# Patient Record
Sex: MALE | Race: WHITE
[De-identification: names, ages, dates, MRNs, and addresses within clinical notes are randomized per-mention and may not be internally consistent; named-entity substitution may affect disease eponyms.]

---

## 2020-04-18 NOTE — PCM.HP.2
H&P History of Present Illness





- General


Date of Service: 04/18/20


Admit Problem/Dx: 


 Admission Diagnosis/Problem





Admission Diagnosis/Problem      Atrial fibrillation








Source of Information: Patient, Family, Provider, RN Notes Reviewed


History Limitations: Reports: No Limitations





- History of Present Illness


Initial Comments - Free Text/Narative: 





Mr. Fairbanks is a 79-year-old gentleman who is admitted to observation status 

through the emergency department for follow-up of facial weakness, dysarthria, 

weakness, and atrial fibrillation with rapid ventricular response, with 

underlying CHF exacerbation.  He denies any significant cardiac history other 

than previous episodes of atrial fibrillation in the distant past, 1 of which 

required cardioversion.  He does have a known history of COPD and continues to 

smoke 1 pack of cigarettes daily.  He has a cumulative 75-pack-year smoking 

history.  He denies recent symptoms of chest pain or pressure or significant 

increase in shortness of breath.  He does report generalized weakness over the 

past few days.  He was unaware of rapid heart rate, when he arrived in the 

emergency department was in atrial fibrillation with rapid ventricular 

response.  He denies being aware of any facial weakness or difficulty with his 

speech, this was noticed by family.  CT scan of the head shows evidence of 

atrophy and microvascular disease, no acute CVA.  Chest x-ray shows evidence of 

cardiac enlargement and pulmonary edema.  There is a questionable lesion noted 

on chest x-ray, with recommendation for follow-up chest x-ray or CT scan for 

further evaluation.





- Related Data


Allergies/Adverse Reactions: 


 Allergies











Allergy/AdvReac Type Severity Reaction Status Date / Time


 


diltiazem Allergy  Cannot Verified 04/18/20 13:20





   Remember  











Home Medications: 


 Home Meds





Cyanocobalamin (Vitamin B-12) [Vitamin B-12] 1 tab PO DAILY 04/18/20 [History]


Lisinopril/Hydrochlorothiazide [Lisinopril-Hctz 20-12.5 mg Tab] 2 tab PO DAILY 

04/18/20 [History]


Verapamil [Verapamil SR (24 Hr)] 180 mg PO DAILY 04/18/20 [History]











Past Medical History


HEENT History: Reports: Impaired Vision


Cardiovascular History: Reports: Hypertension


Neurological History: Reports: None





- Past Surgical History


Head Surgeries/Procedures: Reports: None


HEENT Surgical History: Reports: None


Cardiovascular Surgical History: Reports: None


Neurological Surgical History: Reports: Lumbar Spine





Social & Family History





- Tobacco Use


Smoking Status *Q: Current Every Day Smoker


Years of Tobacco use: 40


Packs/Tins Daily: 2


Used Tobacco, but Quit: No


Second Hand Smoke Exposure: No





- Caffeine Use


Caffeine Use: Reports: None





- Alcohol Use


Days Per Week of Alcohol Use: 7


Number of Drinks Per Day: 2


Total Drinks Per Week: 14





- Recreational Drug Use


Recreational Drug Use: No





H&P Review of Systems





- Review of Systems:


Review Of Systems: See Below


General: Reports: Malaise, Weakness, Decreased Appetite.  Denies: Fever, Chills


HEENT: Reports: No Symptoms


Pulmonary: Reports: Shortness of Breath, Wheezing.  Denies: Pleuritic Chest Pain

, Cough, Sputum, Hemoptysis


Cardiovascular: Reports: Dyspnea on Exertion, Edema.  Denies: Chest Pain, 

Palpitations, Orthopnea, PND, Lightheadedness


Gastrointestinal: Reports: No Symptoms


Genitourinary: Reports: No Symptoms


Musculoskeletal: Reports: No Symptoms


Skin: Reports: No Symptoms


Psychiatric: Reports: No Symptoms


Neurological: Reports: No Symptoms


Hematologic/Lymphatic: Reports: No Symptoms


Immunologic: Reports: No Symptoms





Exam





- Exam


Exam: See Below





- Vital Signs


Vital Signs: 


 Last Vital Signs











Temp  95.2 F L  04/18/20 13:36


 


Pulse  103 H  04/18/20 17:09


 


Resp  24 H  04/18/20 17:09


 


BP  134/90   04/18/20 17:09


 


Pulse Ox  91 L  04/18/20 16:06











Weight: 210 lb





- Exam


Quality Assessment: DVT Prophylaxis


General: Alert, Oriented, Cooperative, Mild Distress


HEENT: Conjunctiva Clear, Hearing Intact, Mucosa Moist & Pink, Normal Nasal 

Septum, Posterior Pharynx Clear, Pupils Equal


Neck: Supple, Trachea Midline, +2 Carotid Pulse wo Bruit


Lungs: Decreased Breath Sounds, Wheezing.  No: Crackles, Rales, Rhonchi, Rub


Cardiovascular: Normal S1, Normal S2, Irregular Rhythm, Tachycardia.  No: 

Systolic Murmur, Diastolic Murmur


GI/Abdominal Exam: Soft, Non-Tender, No Organomegaly, No Distention


Back Exam: Normal Inspection, Full Range of Motion


Extremities: Non-Tender, Pedal Edema


Skin: Warm, Dry, Intact


Neurological: Cranial Nerves Intact, Strength Equal Bilateral, Normal Speech, 

Normal Tone, Sensation Intact.  No: Focal Deficit


Neuro Extensive - Mental Status: Alert, Oriented x3, Normal Mood/Affect, Normal 

Cognition, Memory Intact





- Patient Data


Lab Results Last 24 hrs: 


 Laboratory Results - last 24 hr











  04/18/20 04/18/20 04/18/20 Range/Units





  13:50 13:52 13:52 


 


WBC  8.1    (4.5-11.0)  K/uL


 


RBC  4.54    (4.30-5.90)  M/uL


 


Hgb  15.3 H    (12.0-15.0)  g/dL


 


Hct  45.9    (40.0-54.0)  %


 


MCV  101 H    (80-98)  fL


 


MCH  34 H    (27-31)  pg


 


MCHC  33    (32-36)  %


 


Plt Count  141 L    (150-400)  K/uL


 


Neut % (Auto)  80 H    (36-66)  %


 


Lymph % (Auto)  7 L    (24-44)  %


 


Mono % (Auto)  11 H    (2-6)  %


 


Eos % (Auto)  1 L    (2-4)  %


 


Baso % (Auto)  1    (0-1)  %


 


Sodium   132 L   (140-148)  mmol/L


 


Potassium   4.9   (3.6-5.2)  mmol/L


 


Chloride   95 L   (100-108)  mmol/L


 


Carbon Dioxide   30   (21-32)  mmol/L


 


Anion Gap   11.9   (5.0-14.0)  mmol/L


 


BUN   10   (7-18)  mg/dL


 


Creatinine   0.8   (0.8-1.3)  mg/dL


 


Est Cr Clr Drug Dosing   73.63   mL/min


 


Estimated GFR (MDRD)   > 60   (>60)  


 


Glucose   159 H   ()  mg/dL


 


Calcium   8.4 L   (8.5-10.1)  mg/dL


 


Total Bilirubin   0.8   (0.2-1.0)  mg/dL


 


AST   28   (15-37)  U/L


 


ALT   6 L   (12-78)  U/L


 


Alkaline Phosphatase   102   ()  U/L


 


Troponin I   0.021   (0.000-0.056)  ng/mL


 


Total Protein   6.9   (6.4-8.2)  g/dL


 


Albumin   3.3 L   (3.4-5.0)  g/dL


 


Globulin   3.6 H   (2.3-3.5)  g/dL


 


Albumin/Globulin Ratio   0.9 L   (1.2-2.2)  


 


Ethyl Alcohol    < 3  mg/dL











Result Diagrams: 


 04/18/20 13:50





 04/18/20 13:52





Sepsis Event Note





- Evaluation


Sepsis Screening Result: No Definite Risk





- Focused Exam


Vital Signs: 


 Vital Signs











  Temp Pulse Resp BP Pulse Ox


 


 04/18/20 17:09   103 H  24 H  134/90 


 


 04/18/20 16:06   113 H  16  145/97 H  91 L


 


 04/18/20 15:43   114 H  19  138/90  90 L


 


 04/18/20 15:15   104 H  20  127/82  91 L


 


 04/18/20 14:59   106 H  27 H  135/96 H  90 L


 


 04/18/20 14:06   110 H  26 H  139/75  90 L


 


 04/18/20 14:01   121 H  27 H  134/93 H  93 L


 


 04/18/20 13:36  95.2 F L  117 H  19  151/98 H  90 L


 


 04/18/20 13:31  95.2 F L  117 H  19  151/98 H  90 L











Date Exam was Performed: 04/18/20


Time Exam was Performed: 17:29





*Q Meaningful Use (ADM)





- VTE Risk Assess *Q


Each Risk Factor Represents 1 Point: Obesity ( BMI > 25 kg/m2), Congestive 

heart failure (CHF), Abnormal Pulmonary Function (COPD)


Total Score 1 Point Risk Factors: 3


Each Risk Factor Represents 2 Points: None


Total Score 2 Point Risk Factors: 0


Each Risk Factor Represents 3 Points: Age 75 Years or Greater


Total Score 3 Point Risk Factors: 3


Each Risk Factor Represents 5 Points: None


Total Score 5 Point Risk Factors: 0


Venous Thromboembolism Risk Factor Score *Q: 6


Problem List Initiated/Reviewed/Updated: Yes


Orders Last 24hrs: 


 Active Orders 24 hr











 Category Date Time Status


 


 Patient Status Manage Transfer [TRANSFER] Routine ADT  04/18/20 17:17 Ordered


 


 EKG Documentation Completion [RC] ASDIRECTED Care  04/18/20 14:26 Active


 


 Resuscitation Status Routine Resus Stat  04/18/20 17:22 Ordered


 


 EKG 12 Lead [EK] Routine Ther  04/18/20 14:25 Ordered











Assessment/Plan Comment:: 





ASSESSMENT AND PLAN





ATRIAL FIBRILLATION WITH RAPID VENTRICULAR RESPONSE-he is unaware of this and 

also unaware of when it started.  Cardioversion not an option at this point 

because of unknown duration of the rhythm.  Calculated [id number] score is 4.

  We discussed options for anticoagulation and he is opted for use of Eliquis.


-Resume verapamil 180 mg daily for rate control


-Eliquis 5 mg p.o. twice daily


-Outpatient echocardiogram


-Consider cardioversion after at least 3 weeks of anticoagulation





CONGESTIVE HEART FAILURE-he denies previous history of this, chest x-ray shows 

evidence of cardiac enlargement and pulmonary edema


-Lasix 20 mg IV now


-Resume ACE inhibitor therapy


-Echo as above


-Consider addition of beta-blocker to current regimen depending on rate control





PULMONARY LESION NOTED ON CHEST X-RAY


-CT scan of the chest for further evaluation





COPD-does not currently require supplemental oxygen


-Nebulizer therapy as needed





MAINTENANCE ISSUES


-DVT prophylaxis; current therapy with Eliquis should provide adequate DVT 

prophylaxis


-GI prophylaxis; not indicated


-Lepe catheter; not indicated


-Nutrition; 2 g sodium diet


-Nicotine dependence; nicotine patch and gum





CODE STATUS-FULL CODE





ADMISSION STATUS-this patient will be admitted to observation status, expect no 

more than a one night hospital stay for evaluation and management of problems 

as outlined above.





DISPOSITION-anticipate discharge to home after the hospital stay.





PRIMARY CARE PROVIDER-Dr. Lopez





- Mortality Measure


Prognosis:: Good
113.2

## 2020-04-18 NOTE — CRLCT
INDICATION: Evaluate abnormal chest x-ray



HISTORY:



Evaluate abnormal chest x-ray.



COMPARISON:



Chest, 2 views, 04/18/2020, 1428 hours.



TECHNIQUE:



CT of the chest. No intravenous contrast. Coronal/sagittal reconstruction 

images.



FINDINGS:



Cardiomegaly. Moderate, non loculated, bilateral pleural effusions, left 

larger than right. Dense coronary artery calcifications. Main pulmonary 

artery is dilated to 39 mm. Consider pulmonary arterial hypertension. 

Ascending thoracic aorta is dilated at 44 mm. There are nonenlarged lymph 

nodes present in the mediastinum. None meet size criteria for pathologic 

enlargement. 



The lung windows demonstrate a spiculated opacity in the right upper lobe, 

posterior segment, which is noncalcified, and not cavitary. This measures 

2.0 x 2.1 cm in AP and transverse dimensions, and is suspicious for 

bronchogenic carcinoma. Suggest pulmonary consultation, and consideration 

for PET-CT. There is centrilobular emphysema. There is a region of scarring 

in the left upper lobe on image 23, series 3. There is no pneumothorax. 

There is passive atelectasis adjacent to both pleural effusions. No 

resorptive atelectasis.



Evaluation of the upper abdomen demonstrates a small amount of perihepatic 

ascites. The liver morphology is non cirrhotic. The spleen size is normal. 

There is no hydronephrosis or perinephric fluid collection. There is no 

pancreatic mass or pancreatic duct dilation.



The bone windows demonstrate no lytic or blastic bone lesions. There is 

spurring throughout the endplates of the thoracic spine. The vertebral body 

heights are maintained on sagittal reconstruction images. The manubrium and 

body of the sternum are intact.



IMPRESSION:



1. Spiculated opacity in the right upper lobe, posterior segment, with 

adjacent pleural thickening. This is seen on image 41, series 3.



2. Bronchogenic carcinoma should be considered given the suspicious 

morphology.



3. Pulmonary consultation and/or consideration for PET-CT is suggested. The 

opacity is better seen on the PA/AP view of the recent chest x-ray from 

today. 



4. Cardiomegaly, with bilateral, non loculated pleural effusions.



5. Dense coronary artery calcifications.



6. Dilated main pulmonary artery, which suggests pulmonary arterial 

hypertension.



7. No thoracic lymphadenopathy by size criteria.



Dictated by Dmitriy Pratt MD @ 4/18/2020 7:36:38 PM



Please note that all CT scans at this facility use dose modulation, 

iterative reconstruction, and/or weight-based dosing when appropriate to 

reduce radiation dose to as low as reasonably achievable.



Dictated by: Dmitriy Pratt MD @ 04/18/2020 19:37:21



(Electronically Signed)

## 2020-04-18 NOTE — EDM.PDOC
ED HPI GENERAL MEDICAL PROBLEM





- General


Chief Complaint: Cardiovascular Problem


Stated Complaint: BLOOD PRESSURE


Time Seen by Provider: 04/18/20 13:40


Source of Information: Reports: Patient, Family


History Limitations: Reports: No Limitations





- History of Present Illness


INITIAL COMMENTS - FREE TEXT/NARRATIVE: 





79-year-old male who lives appear independently, heavy smoker and alcohol user 

with a history of hypertension.  He was on antihypertensive medication up until 

a few months ago but stopped taking them.  His daughter called him and talked 

to them for quite a while 3 days ago and he sounded great, today she came up to 

visit him and he has possible left-sided facial droop, slurred speech, 

confusion and profound fatigue.  He also has mild shortness of breath with 

activity.  His family thinks they have noticed more shortness of breath 

recently on the phone.  He has no pain, he has had no recent chest pain, nausea 

or vomiting and does not feel any peripheral weakness of his arms or legs.  He 

has no difficulty walking.


Onset: Unknown/Unsure


Associated Symptoms: Reports: Confusion, Cough (Chronic cough), Shortness of 

Breath (Especially with activity).  Denies: Chest Pain, Fever/Chills, Headaches





- Related Data


 Allergies











Allergy/AdvReac Type Severity Reaction Status Date / Time


 


diltiazem Allergy  Cannot Verified 04/18/20 13:20





   Remember  











Home Meds: 


 Home Meds





Cyanocobalamin (Vitamin B-12) [Vitamin B-12] 1 tab PO DAILY 04/18/20 [History]


Lisinopril/Hydrochlorothiazide [Lisinopril-Hctz 20-12.5 mg Tab] 2 tab PO DAILY 

04/18/20 [History]


Verapamil [Verapamil SR (24 Hr)] 180 mg PO DAILY 04/18/20 [History]











Past Medical History


HEENT History: Reports: Impaired Vision


Cardiovascular History: Reports: Hypertension


Neurological History: Reports: None





- Past Surgical History


Head Surgeries/Procedures: Reports: None


HEENT Surgical History: Reports: None


Cardiovascular Surgical History: Reports: None


Neurological Surgical History: Reports: Lumbar Spine





Social & Family History





- Tobacco Use


Smoking Status *Q: Current Every Day Smoker


Years of Tobacco use: 40


Packs/Tins Daily: 2


Used Tobacco, but Quit: No


Second Hand Smoke Exposure: No





- Caffeine Use


Caffeine Use: Reports: None





- Alcohol Use


Days Per Week of Alcohol Use: 7


Number of Drinks Per Day: 2


Total Drinks Per Week: 14





- Recreational Drug Use


Recreational Drug Use: No





ED ROS GENERAL





- Review of Systems


Review Of Systems: See Below


Constitutional: Reports: Malaise.  Denies: Fever, Chills


HEENT: Denies: Vision Change


Respiratory: Reports: Shortness of Breath, Cough


Cardiovascular: Denies: Chest Pain, Palpitations


GI/Abdominal: Denies: Abdominal Pain, Nausea, Vomiting


Neurological: Reports: Other (Patient has lower extremity neuropathy)


Psychiatric: Reports: No Symptoms





ED EXAM, GENERAL





- Physical Exam


Exam: See Below


Exam Limited By: No Limitations


General Appearance: Alert, No Apparent Distress


Eye Exam: Bilateral Eye: EOMI (No jaundice)


Head: Atraumatic


Neck: Normal Inspection, Supple, Non-Tender


Respiratory/Chest: Other (Diffuse decreased breath sounds especially in the 

bases bilaterally, scattered expiratory wheezes)


Cardiovascular: No Murmur, Irregularly Irregular


GI/Abdominal: Soft, Non-Tender


Extremities: No Pedal Edema


Neurological: Alert, Oriented, Other (Patient appears to have some slight left 

facial droop at rest with some mild dysarthria, but actively his facial muscles 

look symmetric. No other neurologic findings or asymmetry)


Psychiatric: Normal Affect, Normal Mood


Skin Exam: Warm, Dry





Course





- Vital Signs


Last Recorded V/S: 


 Last Vital Signs











Temp  96.3 F L  04/18/20 17:52


 


Pulse  108 H  04/18/20 17:52


 


Resp  18   04/18/20 17:52


 


BP  128/103 H  04/18/20 17:52


 


Pulse Ox  92 L  04/18/20 17:52














- Orders/Labs/Meds


Orders: 


 Active Orders 24 hr











 Category Date Time Status


 


 EKG 12 Lead [EK] Routine Ther  04/18/20 14:25 Stop Req








 Medication Orders





Acetaminophen (Tylenol)  650 mg PO Q4H PRN


   PRN Reason: Pain (Mild 1-3)/fever


Albuterol (Proventil Neb Soln)  2.5 mg NEB Q4H PRN


   PRN Reason: Shortness Of Breath/wheezing


Apixaban (Eliquis)  5 mg PO Q12H ROD


Cyanocobalamin (Vitamin B12)   mcg PO DAILY ROD


Nicotine (Habitrol)  21 mg TRDERM DAILY ROD


Nicotine Polacrilex (Nicorelief)  2 mg CHEW Q1H PRN


   PRN Reason: Other


Non-Formulary Medication (Lisinopril/Hydrochlorothiazide [Lisinopril-Hctz 20-

12.5 Mg Tab])  2 tab PO DAILY ROD


Non-Formulary Medication (Verapamil [Verelan])  180 mg PO DAILY ROD


Ondansetron HCl (Zofran)  4 mg IV Q4H PRN


   PRN Reason: Nausea/Vomiting


Polyethylene Glycol (Miralax)  17 gm PO DAILY PRN


   PRN Reason: Constipation


Sodium Chloride (Saline Flush)  10 ml FLUSH ASDIRECTED PRN


   PRN Reason: Keep Vein Open








Labs: 


 Laboratory Tests











  04/18/20 04/18/20 04/18/20 Range/Units





  13:50 13:52 13:52 


 


WBC  8.1    (4.5-11.0)  K/uL


 


RBC  4.54    (4.30-5.90)  M/uL


 


Hgb  15.3 H    (12.0-15.0)  g/dL


 


Hct  45.9    (40.0-54.0)  %


 


MCV  101 H    (80-98)  fL


 


MCH  34 H    (27-31)  pg


 


MCHC  33    (32-36)  %


 


Plt Count  141 L    (150-400)  K/uL


 


Neut % (Auto)  80 H    (36-66)  %


 


Lymph % (Auto)  7 L    (24-44)  %


 


Mono % (Auto)  11 H    (2-6)  %


 


Eos % (Auto)  1 L    (2-4)  %


 


Baso % (Auto)  1    (0-1)  %


 


Sodium   132 L   (140-148)  mmol/L


 


Potassium   4.9   (3.6-5.2)  mmol/L


 


Chloride   95 L   (100-108)  mmol/L


 


Carbon Dioxide   30   (21-32)  mmol/L


 


Anion Gap   11.9   (5.0-14.0)  mmol/L


 


BUN   10   (7-18)  mg/dL


 


Creatinine   0.8   (0.8-1.3)  mg/dL


 


Est Cr Clr Drug Dosing   73.63   mL/min


 


Estimated GFR (MDRD)   > 60   (>60)  


 


Glucose   159 H   ()  mg/dL


 


Calcium   8.4 L   (8.5-10.1)  mg/dL


 


Total Bilirubin   0.8   (0.2-1.0)  mg/dL


 


AST   28   (15-37)  U/L


 


ALT   6 L   (12-78)  U/L


 


Alkaline Phosphatase   102   ()  U/L


 


Troponin I   0.021   (0.000-0.056)  ng/mL


 


Total Protein   6.9   (6.4-8.2)  g/dL


 


Albumin   3.3 L   (3.4-5.0)  g/dL


 


Globulin   3.6 H   (2.3-3.5)  g/dL


 


Albumin/Globulin Ratio   0.9 L   (1.2-2.2)  


 


Ethyl Alcohol    < 3  mg/dL











Meds: 


Medications











Generic Name Dose Route Start Last Admin





  Trade Name Freq  PRN Reason Stop Dose Admin


 


Acetaminophen  650 mg  04/18/20 18:16  





  Tylenol  PO   





  Q4H PRN   





  Pain (Mild 1-3)/fever   





     





     





     


 


Albuterol  2.5 mg  04/18/20 18:16  





  Proventil Neb Soln  NEB   





  Q4H PRN   





  Shortness Of Breath/wheezing   





     





     





     


 


Apixaban  5 mg  04/18/20 18:16  





  Eliquis  PO   





  Q12H ROD   





     





     





     





     


 


Cyanocobalamin   mcg  04/18/20 18:16  





  Vitamin B12  PO   





  DAILY ROD   





     





     





     





     


 


Nicotine  21 mg  04/18/20 18:16  





  Habitrol  TRDERM   





  DAILY [facility]   





     





     





     





     


 


Nicotine Polacrilex  2 mg  04/18/20 18:16  





  Nicorelief  CHEW   





  Q1H PRN   





  Other   





     





     





     


 


Non-Formulary Medication  2 tab  04/19/20 09:00  





  Lisinopril/Hydrochlorothiazide [Lisinopril-Hctz 20-12.5 Mg Tab]  PO   





  DAILY ROD   





     





     





     





     


 


Non-Formulary Medication  180 mg  04/18/20 18:16  





  Verapamil [Verelan]  PO   





  DAILY ROD   





     





     





     





     


 


Ondansetron HCl  4 mg  04/18/20 18:16  





  Zofran  IV   





  Q4H PRN   





  Nausea/Vomiting   





     





     





     


 


Polyethylene Glycol  17 gm  04/18/20 18:16  





  Miralax  PO   





  DAILY PRN   





  Constipation   





     





     





     


 


Sodium Chloride  10 ml  04/18/20 18:16  





  Saline Flush  FLUSH   





  ASDIRECTED PRN   





  Keep Vein Open   





     





     





     














Discontinued Medications














Generic Name Dose Route Start Last Admin





  Trade Name Freq  PRN Reason Stop Dose Admin


 


Furosemide  20 mg  04/18/20 18:16  





  Lasix  IVPUSH  04/18/20 18:17  





  NOW ONE   





     





     





     





     


 


Verapamil HCl  5 mg  04/18/20 13:56  04/18/20 14:00





  Calan  IVPUSH  04/18/20 13:57  5 mg





  ONETIME ONE   Administration





     





     





     





     














- Re-Assessments/Exams


Free Text/Narrative Re-Assessment/Exam: 





04/18/20 14:27


Patient was placed on cardiac monitoring is in a rapid ventricular response 

atrial fibrillation at 130-140.  He has no previous history of atrial 

fibrillation.  He has continued to drink alcohol daily and smokes fairly 

heavily.  A chest x-ray will be obtained as well as a CBC, CMP, and a head CT 

without contrast.  IV will be started and he will be given 5 mg of IV verapamil.


04/18/20 15:12


There was fairly significant rate control with the 5 mg of verapamil, patient 

remained in atrial fibrillation.


04/18/20 15:59





IMPRESSION:


1. No hemorrhage or intracranial acute radiographic abnormality. 


2. Chronic changes suggesting cerebral volume loss and probable chronic 

ischemic microvascular decreased attenuation in the central white matter.





Chest x-ray findings were as follows





IMPRESSION:


1. Cardiomegaly and mild perihilar alveolar opacities. Correlate with any 

symptoms of congestive heart failure or pulmonary edema. 


2. Possible small left pleural effusion. 


Basilar opacity in the left base could also represent some parenchymal opacity 

atelectasis or pneumonia. 


3. Small focal patchy irregular nodular 1.8 cm opacity in the right upper lobe. 

This could be superimposition of markings although would suggest a follow-up 

chest radiograph to evaluate for resolution or CT scan of the chest.





When the son arrived he felt his dad's lower lip looked "a little swollen" but 

otherwise his face looked his usual baseline.  Explained to the patient that 

with his atrial fibrillation with RVR, I do not feel comfortable with him 

leaving, he may have some mild congestive heart failure as well.  Dr. Baez 

kindly agreed to visit with the patient to discuss possible admission.





Departure





- Departure


Time of Disposition: 17:47


Disposition: Admitted As Inpatient 67


Clinical Impression: 


 Atrial fibrillation with RVR





Congestive heart failure (CHF)


Qualifiers:


 Heart failure type: unspecified Heart failure chronicity: unspecified 

Qualified Code(s): I50.9 - Heart failure, unspecified








Sepsis Event Note





- Evaluation


Sepsis Screening Result: No Definite Risk





- Focused Exam


Vital Signs: 


 Vital Signs











  Temp Pulse Resp BP Pulse Ox


 


 04/18/20 17:09   103 H  24 H  134/90 


 


 04/18/20 16:06   113 H  16  145/97 H  91 L


 


 04/18/20 15:43   114 H  19  138/90  90 L


 


 04/18/20 15:15   104 H  20  127/82  91 L


 


 04/18/20 14:59   106 H  27 H  135/96 H  90 L


 


 04/18/20 14:06   110 H  26 H  139/75  90 L


 


 04/18/20 14:01   121 H  27 H  134/93 H  93 L


 


 04/18/20 13:36  95.2 F L  117 H  19  151/98 H  90 L


 


 04/18/20 13:31  95.2 F L  117 H  19  151/98 H  90 L











Date Exam was Performed: 04/18/20


Time Exam was Performed: 18:30





- My Orders


Last 24 Hours: 


My Active Orders





04/18/20 14:25


EKG 12 Lead [EK] Routine 














- Assessment/Plan


Last 24 Hours: 


My Active Orders





04/18/20 14:25


EKG 12 Lead [EK] Routine

## 2020-04-18 NOTE — CRLCT
INDICATION:



Confusion. Facial droop and history of atrial fibrillation.



TECHNIQUE:



CT scan of the brain was performed without contrast.



COMPARISON:



No comparison.



FINDINGS:



Extra-axial spaces:



Mildly prominent. No extra-axial hemorrhage.



Ventricles:



Mildly prominent.



No midline shift.



Brain:



No intra-axial hemorrhage.



No intracranial mass.



Diffuse decreased attenuation in the periventricular white matter.



Atherosclerotic intracranial vascular calcification.



Bony calvarium: No significant abnormalities.



IMPRESSION:



1. No hemorrhage or intracranial acute radiographic abnormality. 



2. Chronic changes suggesting cerebral volume loss and probable chronic 

ischemic microvascular decreased attenuation in the central white matter.



Please note that all CT scans at this facility use dose modulation, 

iterative reconstruction, and/or weight-based dosing when appropriate to 

reduce radiation dose to as low as reasonably achievable.



Dictated by Steven Winston MD @ Apr 18 2020  3:34PM



Signed by Dr. Steven Winston @ Apr 18 2020  3:37PM

## 2020-04-18 NOTE — CRLCR
INDICATION:



Dyspnea.



TECHNIQUE:



Two-view chest.



FINDINGS:



Heart and mediastinum: The heart is moderately enlarged. Pulmonary vessels 

appear upper normal.



Lungs and pleura: Some patchy perihilar alveolar opacities are present. The 

left costophrenic sulcus shows blunting which could be from some small 

amount of pleural fluid. No pneumothorax. 



Additional focal rounded patchy opacity in the right upper lobe overlying 

the right 3rd rib.



Additional focal rounded patchy opacity in the right upper lobe overlying 

the right 3rd rib.



Miscellaneous: There is increased density in the posterior hemithorax on 

the lateral view which is possibly related to opacity in the posterior left 

lower lobe or elevation of the left hemidiaphragm.



Overlying EKG monitors are present.



IMPRESSION:



1. Cardiomegaly and mild perihilar alveolar opacities. Correlate with any 

symptoms of congestive heart failure or pulmonary edema. 



2. Possible small left pleural effusion. 



Basilar opacity in the left base could also represent some parenchymal 

opacity atelectasis or pneumonia. 



3. Small focal patchy irregular nodular 1.8 cm opacity in the right upper 

lobe. This could be superimposition of markings although would suggest a 

follow-up chest radiograph to evaluate for resolution or CT scan of the 

chest.



Dictated by Steven Winston MD @ Apr 18 2020  3:51PM



Signed by Dr. Steven Winston @ Apr 18 2020  3:55PM

## 2020-04-19 NOTE — PCM.DCSUM1
**Discharge Summary





- Hospital Course


Brief History: Mr. Fairbanks is a 79-year-old gentleman who was admitted through 

the emergency department observation status for further management of 

congestive heart failure and atrial fibrillation with rapid ventricular 

response.





- Discharge Data


Discharge Date: 04/19/20


Discharge Disposition: Home, Self-Care 01


Condition: Poor





- Referral to Home Health


Primary Care Physician: 


Jesus Lopez MD








- Discharge Diagnosis/Problem(s)


(1) Mass of upper lobe of right lung


SNOMED Code(s): 682770152, 127421494


   ICD Code: R91.8 - OTHER NONSPECIFIC ABNORMAL FINDING OF LUNG FIELD   Status: 

Acute   Current Visit: Yes   





(2) Hypertension


SNOMED Code(s): 37198723


   ICD Code: I10 - ESSENTIAL (PRIMARY) HYPERTENSION   Status: Acute   Current 

Visit: Yes   





(3) Atrial fibrillation with RVR


SNOMED Code(s): 310034090702674


   ICD Code: I48.91 - UNSPECIFIED ATRIAL FIBRILLATION   Status: Acute   Current 

Visit: Yes   





(4) Congestive heart failure (CHF)


SNOMED Code(s): 49045430


   ICD Code: I50.9 - HEART FAILURE, UNSPECIFIED   Status: Acute   Current Visit

: Yes   


Qualifiers: 


   Heart failure type: unspecified   Heart failure chronicity: unspecified   

Qualified Code(s): I50.9 - Heart failure, unspecified   





- Patient Summary/Data


Hospital Course: 





Mr. Fairbanks is a 79-year-old gentleman who is admitted to observation status 

through the emergency department for follow-up of facial weakness, dysarthria, 

weakness, and atrial fibrillation with rapid ventricular response, with 

underlying CHF exacerbation.  He denies any significant cardiac history other 

than previous episodes of atrial fibrillation in the distant past, 1 of which 

required cardioversion.  He does have a known history of COPD and continues to 

smoke 1 pack of cigarettes daily.  He has a cumulative 75-pack-year smoking 

history.  He denies recent symptoms of chest pain or pressure or significant 

increase in shortness of breath.  He does report generalized weakness over the 

past few days.  He was unaware of rapid heart rate, when he arrived in the 

emergency department was in atrial fibrillation with rapid ventricular 

response.  He denies being aware of any facial weakness or difficulty with his 

speech, this was noticed by family.  CT scan of the head shows evidence of 

atrophy and microvascular disease, no acute CVA.  Chest x-ray shows evidence of 

cardiac enlargement and pulmonary edema.  There is a questionable lesion noted 

on chest x-ray, with recommendation for follow-up chest x-ray or CT scan for 

further evaluation.  He was given furosemide 20 mg IV and a CT scan of the 

chest without contrast was ordered.  CT scan of the chest did show a suspicious 

lesion in the right upper lobe concerning for malignancy.  He had no further 

neurologic symptoms noted during hospitalization.  Initially he was started 

back on his oral verapamil, but then the decision was made to start him on beta-

blocker therapy because of his underlying congestive heart failure.  On the 

morning of discharge his heart rate remained elevated, there was some 

improvement in peripheral edema with diuretic therapy.  Mr. Fairbanks removed his 

IV and got dressed, adamantly requested that he be discharged home.  I did 

explain to him that we did not yet have his heart rate under control or 

adequately treated his congestive heart failure.  He refused further 

hospitalization but will agree to outpatient evaluation and management.  He 

will require echocardiogram as an outpatient to further evaluate his congestive 

failure, atrial fibrillation, and possible TIA, looking for any evidence of 

clots within the heart.  He will also be scheduled for outpatient MRI and MRA 

of the brain for further evaluation of his transient speech difficulty and 

facial weakness.  Verapamil will be discontinued and he will be started on 

metoprolol 50 mg twice daily.  He will be on lisinopril 20 mg daily, 

hydrochlorothiazide will be discontinued.  Furosemide 20 mg daily will be 

started for management of his fluid overload and congestive heart failure.  We 

did have a discussion concerning anticoagulation, his [id number] score was 

calculated at 4.  We discussed options for anticoagulation including warfarin 

versus newer agents, after discussion he will be started on Eliquis 5 mg twice 

daily for anticoagulation of his atrial fibrillation.  Follow-up appointment 

will be scheduled with Dr. Lopez, hopefully within the next few days.  BMP 

should be obtained at the time of follow-up appointment given his new 

medications and diuretic therapy.  He will require further outpatient 

evaluation of his possible right lung mass, including biopsy and referral to 

oncology.  Activity will be as tolerated and he is encouraged to follow a low-

sodium diet.





- Patient Instructions


Diet: Low Sodium


Activity: As Tolerated


Other/Special Instructions: Schedule outpatient echocardiogram for further 

evaluation of CHF and atrial fibrillation.  Schedule follow-up appointment with 

primary care provider within the next few days.  BMP should be obtained at the 

time of follow-up appointment.  MRI of the brain and MRA of the head and neck 

will be scheduled as an outpatient for further evaluation of transient speech 

difficulty and facial weakness.





- Discharge Plan


*PRESCRIPTION DRUG MONITORING PROGRAM REVIEWED*: Not Applicable


*COPY OF PRESCRIPTION DRUG MONITORING REPORT IN PATIENT [facility]: Not Applicable


Prescriptions/Med Rec: 


Apixaban [Eliquis] 5 mg PO BID #60 tablet


Furosemide [Lasix] 20 mg PO DAILY #30 tab


Lisinopril [Zestril] 20 mg PO DAILY #30 tablet


Metoprolol Tartrate 50 mg PO BID #60 tablet


Home Medications: 


 Home Meds





Cyanocobalamin (Vitamin B-12) [Vitamin B-12] 1 tab PO DAILY 04/18/20 [History]


Apixaban [Eliquis] 5 mg PO BID #60 tablet 04/19/20 [Rx]


Furosemide [Lasix] 20 mg PO DAILY #30 tab 04/19/20 [Rx]


Lisinopril [Zestril] 20 mg PO DAILY #30 tablet 04/19/20 [Rx]


Metoprolol Tartrate 50 mg PO BID #60 tablet 04/19/20 [Rx]








Referrals: 


Jesus Lopez MD [Primary Care Provider] - 





- Discharge Summary/Plan Comment


DC Time >30 min.: No





- Patient Data


Vitals - Most Recent: 


 Last Vital Signs











Temp  95.3 F L  04/19/20 07:19


 


Pulse  128 H  04/19/20 08:59


 


Resp  16   04/19/20 07:19


 


BP  137/95 H  04/19/20 08:59


 


Pulse Ox  88 L  04/19/20 07:19











Weight - Most Recent: 189 lb 12.8 oz


I&O - Last 24 hours: 


 Intake & Output











 04/18/20 04/19/20 04/19/20





 22:59 06:59 14:59


 


Intake Total  240 


 


Balance  240 











Lab Results - Last 24 hrs: 


 Laboratory Results - last 24 hr











  04/18/20 04/18/20 04/18/20 Range/Units





  13:50 13:52 13:52 


 


WBC  8.1    (4.5-11.0)  K/uL


 


RBC  4.54    (4.30-5.90)  M/uL


 


Hgb  15.3 H    (12.0-15.0)  g/dL


 


Hct  45.9    (40.0-54.0)  %


 


MCV  101 H    (80-98)  fL


 


MCH  34 H    (27-31)  pg


 


MCHC  33    (32-36)  %


 


Plt Count  141 L    (150-400)  K/uL


 


Neut % (Auto)  80 H    (36-66)  %


 


Lymph % (Auto)  7 L    (24-44)  %


 


Mono % (Auto)  11 H    (2-6)  %


 


Eos % (Auto)  1 L    (2-4)  %


 


Baso % (Auto)  1    (0-1)  %


 


Sodium   132 L   (140-148)  mmol/L


 


Potassium   4.9   (3.6-5.2)  mmol/L


 


Chloride   95 L   (100-108)  mmol/L


 


Carbon Dioxide   30   (21-32)  mmol/L


 


Anion Gap   11.9   (5.0-14.0)  mmol/L


 


BUN   10   (7-18)  mg/dL


 


Creatinine   0.8   (0.8-1.3)  mg/dL


 


Est Cr Clr Drug Dosing   73.63   mL/min


 


Estimated GFR (MDRD)   > 60   (>60)  


 


Glucose   159 H   ()  mg/dL


 


Calcium   8.4 L   (8.5-10.1)  mg/dL


 


Total Bilirubin   0.8   (0.2-1.0)  mg/dL


 


AST   28   (15-37)  U/L


 


ALT   6 L   (12-78)  U/L


 


Alkaline Phosphatase   102   ()  U/L


 


Troponin I   0.021   (0.000-0.056)  ng/mL


 


Total Protein   6.9   (6.4-8.2)  g/dL


 


Albumin   3.3 L   (3.4-5.0)  g/dL


 


Globulin   3.6 H   (2.3-3.5)  g/dL


 


Albumin/Globulin Ratio   0.9 L   (1.2-2.2)  


 


Ethyl Alcohol    < 3  mg/dL














  04/19/20 Range/Units





  04:13 


 


WBC   (4.5-11.0)  K/uL


 


RBC   (4.30-5.90)  M/uL


 


Hgb   (12.0-15.0)  g/dL


 


Hct   (40.0-54.0)  %


 


MCV   (80-98)  fL


 


MCH   (27-31)  pg


 


MCHC   (32-36)  %


 


Plt Count   (150-400)  K/uL


 


Neut % (Auto)   (36-66)  %


 


Lymph % (Auto)   (24-44)  %


 


Mono % (Auto)   (2-6)  %


 


Eos % (Auto)   (2-4)  %


 


Baso % (Auto)   (0-1)  %


 


Sodium  134 L  (140-148)  mmol/L


 


Potassium  4.0  (3.6-5.2)  mmol/L


 


Chloride  97 L  (100-108)  mmol/L


 


Carbon Dioxide  32  (21-32)  mmol/L


 


Anion Gap  9.0  (5.0-14.0)  mmol/L


 


BUN  11  (7-18)  mg/dL


 


Creatinine  0.7 L  (0.8-1.3)  mg/dL


 


Est Cr Clr Drug Dosing  89.80  mL/min


 


Estimated GFR (MDRD)  > 60  (>60)  


 


Glucose  106  ()  mg/dL


 


Calcium  8.4 L  (8.5-10.1)  mg/dL


 


Total Bilirubin   (0.2-1.0)  mg/dL


 


AST   (15-37)  U/L


 


ALT   (12-78)  U/L


 


Alkaline Phosphatase   ()  U/L


 


Troponin I   (0.000-0.056)  ng/mL


 


Total Protein   (6.4-8.2)  g/dL


 


Albumin   (3.4-5.0)  g/dL


 


Globulin   (2.3-3.5)  g/dL


 


Albumin/Globulin Ratio   (1.2-2.2)  


 


Ethyl Alcohol   mg/dL











Med Orders - Current: 


 Current Medications





Acetaminophen (Tylenol)  650 mg PO Q4H PRN


   PRN Reason: Pain (Mild 1-3)/fever


Albuterol (Proventil Neb Soln)  2.5 mg NEB Q4H PRN


   PRN Reason: Shortness Of Breath/wheezing


Apixaban (Eliquis)  5 mg PO BID Count includes the [facility]


   Last Admin: 04/18/20 21:22 Dose:  5 mg


Benzocaine/Menthol (Cepacol Sore Throat)  1 lozenge MUCMEM Q2H PRN


   PRN Reason: Sore Throat


   Last Admin: 04/18/20 22:40 Dose:  1 lozenge


Cyanocobalamin (Vitamin B12)  1,000 mcg PO DAILY Count includes the [facility]


   Last Admin: 04/18/20 19:36 Dose:  1,000 mcg


Hydrochlorothiazide (Hydrochlorothiazide)  25 mg PO DAILY Count includes the [facility]


Lisinopril (Prinivil)  40 mg PO DAILY Count includes the [facility]


Metoprolol Tartrate (Lopressor)  25 mg PO Q6H Count includes the [facility]


   Last Admin: 04/19/20 08:59 Dose:  25 mg


Nicotine (Habitrol)  21 mg TRDERM DAILY Count includes the [facility]


   Last Admin: 04/18/20 18:36 Dose:  Not Given


Nicotine Polacrilex (Nicorelief)  2 mg CHEW Q1H PRN


   PRN Reason: Other


Ondansetron HCl (Zofran)  4 mg IV Q4H PRN


   PRN Reason: Nausea/Vomiting


Polyethylene Glycol (Miralax)  17 gm PO DAILY PRN


   PRN Reason: Constipation


   Last Admin: 04/18/20 18:57 Dose:  17 gm


Sodium Chloride (Saline Flush)  10 ml FLUSH ASDIRECTED PRN


   PRN Reason: Keep Vein Open





Discontinued Medications





Furosemide (Lasix)  20 mg IVPUSH NOW ONE


   Stop: 04/18/20 18:17


   Last Admin: 04/18/20 18:56 Dose:  20 mg


Furosemide (Lasix)  20 mg IV NOW ONE


   Stop: 04/19/20 08:31


   Last Admin: 04/19/20 09:34 Dose:  Not Given


Lisinopril (Prinivil)  20 mg PO ONETIME ONE


   Stop: 04/18/20 21:30


   Last Admin: 04/18/20 22:39 Dose:  20 mg


Lisinopril (Prinivil) Confirm Administered Dose 20 mg .ROUTE .STK-MED ONE


   Stop: 04/18/20 22:35


   Last Admin: 04/18/20 22:40 Dose:  Not Given


Non-Formulary Medication (Lisinopril/Hydrochlorothiazide [Lisinopril-Hctz 20-

12.5 Mg Tab])  2 tab PO DAILY Count includes the [facility]


Verapamil HCl (Calan)  5 mg IVPUSH ONETIME ONE


   Stop: 04/18/20 13:57


   Last Admin: 04/18/20 14:00 Dose:  5 mg


Verapamil HCl (Calan Sr)  180 mg PO DAILY ROD











- Exam


Quality Assessment: Reports: DVT Prophylaxis


General: Reports: Alert, Oriented, Mild Distress


Lungs: Reports: Decreased Breath Sounds, Wheezing.  Denies: Crackles, Rales, 

Rhonchi, Rub


Cardiovascular: Reports: Irregular Rhythm, Tachycardia


GI/Abdominal Exam: Soft, Non-Tender, No Organomegaly, No Distention


Extremities: Non-Tender, Pedal Edema


Neurological: Reports: No New Focal Deficit





*Q Meaningful Use (DIS)





- VTE *Q


VTE Pharmacological Contraindications *Q: High INR Value

## 2020-06-23 NOTE — CR
CHEST: Portable 6/23/2020 2:36 PM

 

CLINICAL HISTORY:Weakness and hypotension

 

COMPARISON:CT chest 4/18/2020

 

FINDINGS:  Patient has a known right upper lobe mass. It is the slightly larger

and denser in the appearance of April chest x-ray. Heart is enlarged. Pulmonary

vascular is normal. There are no effusions

 

Impression: Enlarging opacity in the right upper lobe with the known right lung

mass.

 

Emphysematous changes

 

Cardiomegaly

## 2020-06-23 NOTE — PCM.HP.2
H&P History of Present Illness





- General


Date of Service: 06/23/20


Admit Problem/Dx: 


                           Admission Diagnosis/Problem





Admission Diagnosis/Problem      Acute kidney injury








Source of Information: Patient, Family, Old Records, Provider, RN Notes Reviewed


History Limitations: Reports: No Limitations





- History of Present Illness


Initial Comments - Free Text/Narative: 





Mr. Fairbanks is a 79-year-old gentleman who was admitted through the emergency 

department for further evaluation and management of hypotension, dehydration, 

and acute kidney injury.  Mr. Fairbanks has a known history of coronary artery 

disease, estimated systolic ejection fraction from echocardiogram in April was 

30 to 35%.  He is also been recently diagnosed with lung carcinoma and completed

a recent course of radiation therapy.  Over the past few weeks he has become 

progressively more weak and lightheaded with standing.  On evaluation in the 

emergency department his creatinine is significantly elevated from baseline 

qualifying is acute kidney injury.  There is no evidence of underlying infection

although his white blood cell count is elevated at 15,000.  Procalcitonin was 

well within normal range making infection much less likely.  He denies any 

recent symptoms of chest pain or pressure and also denies shortness of breath.





- Related Data


Allergies/Adverse Reactions: 


                                    Allergies











Allergy/AdvReac Type Severity Reaction Status Date / Time


 


No Known Allergies Allergy   Verified 06/23/20 16:17











Home Medications: 


                                    Home Meds





Cyanocobalamin (Vitamin B-12) [Vitamin B-12] 1 tab PO DAILY 04/18/20 [History]


Metoprolol Tartrate 50 mg PO BID #60 tablet 04/19/20 [Rx]


lisinopriL [Zestril] 20 mg PO DAILY #30 tablet 04/19/20 [Rx]


Apixaban [Eliquis] 5 mg PO BID 06/23/20 [History]


Diltiazem IR [Cardizem] 60 mg PO BID 06/23/20 [History]


Furosemide [Lasix] 80 mg PO DAILY 06/23/20 [History]


levoFLOXacin [Levaquin] 500 mg PO DAILY 06/23/20 [History]


metOLazone [Metolazone] 5 mg PO ASDIRECTED 06/23/20 [History]


methylPREDNISolone [Methylprednisolone] 4 mg PO ASDIRECTED 06/23/20 [History]











Past Medical History


HEENT History: Reports: Impaired Vision


Cardiovascular History: Reports: Hypertension


Respiratory History: Reports: Other (See Below)


Other Respiratory History: lung cancer with radiation treatments  upper right 

lobe


Gastrointestinal History: Reports: GERD


Neurological History: Reports: None


Oncologic (Cancer) History: Reports: Lung


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





- Past Surgical History


Head Surgeries/Procedures: Reports: None


HEENT Surgical History: Reports: None


Cardiovascular Surgical History: Reports: None


Neurological Surgical History: Reports: Lumbar Spine





Social & Family History





- Tobacco Use


Smoking Status *Q: Current Every Day Smoker


Years of Tobacco use: 64


Packs/Tins Daily: 1





- Caffeine Use


Caffeine Use: Reports: None





H&P Review of Systems





- Review of Systems:


Review Of Systems: See Below


General: Reports: Weakness, Fatigue.  Denies: Fever, Chills, Malaise


HEENT: Reports: No Symptoms


Pulmonary: Reports: No Symptoms


Cardiovascular: Reports: Dyspnea on Exertion, Lightheadedness.  Denies: Chest 

Pain, Palpitations, Orthopnea, PND, Edema, Syncope


Gastrointestinal: Reports: No Symptoms


Genitourinary: Reports: No Symptoms


Musculoskeletal: Reports: No Symptoms


Skin: Reports: No Symptoms


Psychiatric: Reports: No Symptoms


Neurological: Reports: No Symptoms


Hematologic/Lymphatic: Reports: No Symptoms


Immunologic: Reports: No Symptoms





Exam





- Exam


Exam: See Below





- Vital Signs


Vital Signs: 


                                Last Vital Signs











Temp  95.0 F L  06/23/20 14:10


 


Pulse  72   06/23/20 14:57


 


Resp  16   06/23/20 14:57


 


BP  83/49 L  06/23/20 14:57


 


Pulse Ox  94 L  06/23/20 14:57











Weight: 165 lb





- Exam


Quality Assessment: Supplemental Oxygen, DVT Prophylaxis


General: Alert, Oriented, Cooperative, Mild Distress


HEENT: Conjunctiva Clear, Hearing Intact, Mucosa Moist & Pink, Normal Nasal 

Septum, Posterior Pharynx Clear, Pupils Equal


Neck: Supple, Trachea Midline, +2 Carotid Pulse wo Bruit


Lungs: Normal Respiratory Effort, Decreased Breath Sounds.  No: Rales, Rhonchi, 

Wheezing


Cardiovascular: Regular Rate, Normal S1, Normal S2, Irregular Rhythm.  No: 

Systolic Murmur, Diastolic Murmur


GI/Abdominal Exam: Soft, Non-Tender, No Organomegaly, No Distention


Back Exam: Normal Inspection, Full Range of Motion


Extremities: Non-Tender, No Pedal Edema


Skin: Warm, Dry, Intact


Neurological: Cranial Nerves Intact, Strength Equal Bilateral, Normal Speech, 

Normal Tone, Sensation Intact.  No: Focal Deficit


Neuro Extensive - Mental Status: Alert, Oriented x3, Normal Mood/Affect, Normal 

Cognition, Memory Intact





- Patient Data


Lab Results Last 24 hrs: 


                         Laboratory Results - last 24 hr











  06/23/20 06/23/20 06/23/20 Range/Units





  13:54 14:00 14:00 


 


WBC  15.2 H    (4.5-11.0)  K/uL


 


RBC  5.40    (4.30-5.90)  M/uL


 


Hgb  17.0 H    (12.0-15.0)  g/dL


 


Hct  47.7    (40.0-54.0)  %


 


MCV  88    (80-98)  fL


 


MCH  32 H    (27-31)  pg


 


MCHC  36    (32-36)  %


 


Plt Count  152    (150-400)  K/uL


 


PT    13.3 H  (9.5-12.0)  sec


 


INR    1.25 H  (0.80-1.20)  


 


Sodium     (140-148)  mmol/L


 


Potassium     (3.6-5.2)  mmol/L


 


Chloride     (100-108)  mmol/L


 


Carbon Dioxide     (21-32)  mmol/L


 


Anion Gap     (5.0-14.0)  mmol/L


 


BUN     (7-18)  mg/dL


 


Creatinine     (0.8-1.3)  mg/dL


 


Est Cr Clr Drug Dosing     mL/min


 


Estimated GFR (MDRD)     (>60)  


 


Glucose     ()  mg/dL


 


Lactic Acid     (0.4-2.0)  mmol/L


 


Calcium     (8.5-10.1)  mg/dL


 


Total Bilirubin     (0.2-1.0)  mg/dL


 


AST     (15-37)  U/L


 


ALT     (12-78)  U/L


 


Alkaline Phosphatase     ()  U/L


 


Troponin I   < 0.017   (0.000-0.056)  ng/mL


 


NT-Pro-B Natriuret Pep     (5-450)  pg/mL


 


Total Protein     (6.4-8.2)  g/dL


 


Albumin     (3.4-5.0)  g/dL


 


Globulin     (2.3-3.5)  g/dL


 


Albumin/Globulin Ratio     (1.2-2.2)  














  06/23/20 06/23/20 Range/Units





  14:00 14:00 


 


WBC    (4.5-11.0)  K/uL


 


RBC    (4.30-5.90)  M/uL


 


Hgb    (12.0-15.0)  g/dL


 


Hct    (40.0-54.0)  %


 


MCV    (80-98)  fL


 


MCH    (27-31)  pg


 


MCHC    (32-36)  %


 


Plt Count    (150-400)  K/uL


 


PT    (9.5-12.0)  sec


 


INR    (0.80-1.20)  


 


Sodium  129 L   (140-148)  mmol/L


 


Potassium  3.4 L   (3.6-5.2)  mmol/L


 


Chloride  89 L   (100-108)  mmol/L


 


Carbon Dioxide  32   (21-32)  mmol/L


 


Anion Gap  11.4   (5.0-14.0)  mmol/L


 


BUN  93 H* D   (7-18)  mg/dL


 


Creatinine  2.3 H D   (0.8-1.3)  mg/dL


 


Est Cr Clr Drug Dosing  27.33   mL/min


 


Estimated GFR (MDRD)  28 L   (>60)  


 


Glucose  173 H   ()  mg/dL


 


Lactic Acid   2.2 H  (0.4-2.0)  mmol/L


 


Calcium  8.7   (8.5-10.1)  mg/dL


 


Total Bilirubin  1.1 H   (0.2-1.0)  mg/dL


 


AST  22   (15-37)  U/L


 


ALT  26  D   (12-78)  U/L


 


Alkaline Phosphatase  87   ()  U/L


 


Troponin I    (0.000-0.056)  ng/mL


 


NT-Pro-B Natriuret Pep  2876 H   (5-450)  pg/mL


 


Total Protein  7.5   (6.4-8.2)  g/dL


 


Albumin  3.3 L   (3.4-5.0)  g/dL


 


Globulin  4.2 H   (2.3-3.5)  g/dL


 


Albumin/Globulin Ratio  0.8 L   (1.2-2.2)  











Result Diagrams: 


                                 06/23/20 13:54





                                 06/23/20 14:00





Sepsis Event Note





- Evaluation


Sepsis Screening Result: No Definite Risk





- Focused Exam


Vital Signs: 


                                   Vital Signs











  Temp Pulse Resp BP Pulse Ox


 


 06/23/20 14:57   72  16  83/49 L  94 L


 


 06/23/20 14:20   74  14  64/30 L  95


 


 06/23/20 14:10  95.0 F L  88  18  140/121 H  96


 


 06/23/20 13:50   94  20  70/44 L  95


 


 06/23/20 13:40   88   70/36 L 


 


 06/23/20 13:39   96  17  60/40 L  93 L


 


 06/23/20 13:36  95.0 F L  88  18  140/121 H  96











Date Exam was Performed: 06/23/20


Time Exam was Performed: 17:28





*Q Meaningful Use (ADM)





- VTE Risk Assess *Q


Each Risk Factor Represents 1 Point: Congestive heart failure (CHF), Abnormal 

Pulmonary Function (COPD)


Total Score 1 Point Risk Factors: 2


Each Risk Factor Represents 2 Points: Malignancy (present or previous)


Total Score 2 Point Risk Factors: 2


Each Risk Factor Represents 3 Points: Age 75 Years or Greater


Total Score 3 Point Risk Factors: 3


Each Risk Factor Represents 5 Points: None


Total Score 5 Point Risk Factors: 0


Venous Thromboembolism Risk Factor Score *Q: 7


Problem List Initiated/Reviewed/Updated: Yes


Orders Last 24hrs: 


                               Active Orders 24 hr











 Category Date Time Status


 


 Patient Status Manage Transfer [TRANSFER] Routine ADT  06/23/20 15:03 Ordered


 


 EKG Documentation Completion [RC] ASDIRECTED Care  06/23/20 13:55 Active


 


 Chest 1V Frontal [CR] Stat Exams  06/23/20 13:54 Taken


 


 CULTURE BLOOD [BC] Urgent Lab  06/23/20 14:48 Received


 


 CULTURE BLOOD [BC] Urgent Lab  06/23/20 14:53 Received


 


 PROCALCITONIN [CHEM] Routine Lab  06/23/20 14:33 Ordered


 


 UA W/MICROSCOPIC [URIN] Urgent Lab  06/23/20 13:54 Ordered


 


 Sodium Chloride 0.9% [Normal Saline] 1,000 ml Med  06/23/20 15:04 Active





 IV .BOLUS   


 


 Blood Culture x2 Reflex Set [OM.PC] Urgent Oth  06/23/20 14:31 Ordered


 


 Resuscitation Status Routine Resus Stat  06/23/20 15:05 Ordered


 


 EKG 12 Lead [EK] Urgent Ther  06/23/20 13:54 Ordered








                                Medication Orders





Sodium Chloride (Normal Saline)  1,000 mls @ 999 mls/hr IV .BOLUS ONE


   Stop: 06/23/20 16:04


   Last Admin: 06/23/20 15:06  Dose: 999 mls/hr


   Documented by: PREILOR








Assessment/Plan Comment:: 





ASSESSMENT AND PLAN





HYPOTENSION-likely secondary to intravascular volume depletion from diuretic 

therapy as well as effects of antihypertensive medications.


-IV fluids for hydration


-Hold diltiazem and metoprolol this evening





ACUTE KIDNEY INJURY-likely secondary to dehydration and intravascular volume 

depletion secondary to diuretic therapy


-IV fluids for hydration


-Closely monitor urine output and renal function





CONGESTIVE HEART FAILURE-documented decrease in left ventricular systolic 

function noted on echocardiogram from April of this year, estimated ejection 

fraction of 30 to 35%.


-Continue outpatient medications


-Hold diuretic therapy until renal function improves





LUNG CANCER-recent diagnosis, status post radiation therapy last week





MAINTENANCE ISSUES


-DVT prophylaxis; current therapy with Eliquis should provide adequate DVT 

prophylaxis


-GI prophylaxis; not indicated


-Lepe catheter; not indicated


-Nutrition; 2 g sodium diet


-Nicotine dependence; not required





CODE STATUS-FULL CODE





ADMISSION STATUS-patient will be admitted to inpatient status, expect at least a

 2 night hospital stay for evaluation and management of problems as outlined 

above.  At the time of this admission I do not reasonably expected evaluation 

and management of this problem will require more than a 96 hour hospital stay.





DISPOSITION-anticipate discharge to home after the hospital stay.





PRIMARY CARE PROVIDER-Dr. Lopez





- Mortality Measure


Prognosis:: Good

## 2020-06-23 NOTE — EDM.PDOC
ED HPI GENERAL MEDICAL PROBLEM





- General


Chief Complaint: Cardiovascular Problem


Stated Complaint: LOW BLOOD PRESSURE


Time Seen by Provider: 06/23/20 13:40


Source of Information: Reports: Patient, Family


History Limitations: Reports: No Limitations





- History of Present Illness


Onset: Gradual


Duration: Week(s): (2)


Worsens with: Reports: Medication (Was on diuretic medication and claims he lost

30 pounds in less than a month.  Diuretic medicine was just stopped in the last 

several days.)





- Related Data


                                    Allergies











Allergy/AdvReac Type Severity Reaction Status Date / Time


 


diltiazem Allergy  Other Verified 06/23/20 14:29











Home Meds: 


                                    Home Meds





Cyanocobalamin (Vitamin B-12) [Vitamin B-12] 1 tab PO DAILY 04/18/20 [History]


Apixaban [Eliquis] 5 mg PO BID #60 tablet 04/19/20 [Rx]


Furosemide [Lasix] 20 mg PO DAILY #30 tab 04/19/20 [Rx]


Metoprolol Tartrate 50 mg PO BID #60 tablet 04/19/20 [Rx]


lisinopriL [Zestril] 20 mg PO DAILY #30 tablet 04/19/20 [Rx]











Past Medical History


HEENT History: Reports: Impaired Vision


Cardiovascular History: Reports: Hypertension


Neurological History: Reports: None





- Past Surgical History


Head Surgeries/Procedures: Reports: None


HEENT Surgical History: Reports: None


Cardiovascular Surgical History: Reports: None


Neurological Surgical History: Reports: Lumbar Spine





Social & Family History





- Caffeine Use


Caffeine Use: Reports: None





ED ROS GENERAL





- Review of Systems


Review Of Systems: See Below


Constitutional: Reports: Malaise, Weakness, Decreased Appetite.  Denies: Fever, 

Diaphoresis


Respiratory: Denies: Pleuritic Chest Pain, Cough


Cardiovascular: Denies: Chest Pain


Endocrine: Reports: Fatigue


GI/Abdominal: Denies: Abdominal Pain


Musculoskeletal: Denies: Neck Pain


Neurological: Denies: Confusion, Headache


Psychiatric: Reports: Depression


Free Text/Narrative/Comment: 





He has absolutely no complaints of pain





ED EXAM, GENERAL





- Physical Exam


Exam: See Below


Exam Limited By: No Limitations


General Appearance: Alert, Mild Distress


Ears: Normal External Exam


Nose: Other (Dry mucous membranes)


Throat/Mouth: Normal Inspection


Head: Atraumatic, Normocephalic


Neck: Normal Inspection, Non-Tender


Respiratory/Chest: No Respiratory Distress, Lungs Clear, Normal Breath Sounds


Cardiovascular: Normal Peripheral Pulses, Regular Rate, Rhythm, No Edema


GI/Abdominal: Soft, Non-Tender


Extremities: Normal Inspection, Normal Range of Motion


Skin Exam: Dry, Other (Especially lower extremities are flaking )





EKG INTERPRETATION


EKG Date: 06/23/20


Time: 14:00


Rhythm: A-Fib


Rate (Beats/Min): 84


QRS: Other (Occasional PVC)


ST-T: Normal





Course





- Vital Signs


Text/Narrative:: 


Differential diagnosis: Dehydration, electrolyte abnormality, occult infection, 

myocardial infarction.  Patient was given 1 L IV fluid bolus here.  Discussed 

the patient with our hospitalist and patient will have blood cultures and pro 

calcitonin drawn prior to admission


Last Recorded V/S: 


                                Last Vital Signs











Temp  35.0 C L  06/23/20 14:10


 


Pulse  88   06/23/20 14:10


 


Resp  18   06/23/20 14:10


 


BP  140/121 H  06/23/20 14:10


 


Pulse Ox  96   06/23/20 14:10














- Orders/Labs/Meds


Orders: 


                               Active Orders 24 hr











 Category Date Time Status


 


 EKG Documentation Completion [RC] ASDIRECTED Care  06/23/20 13:55 Active


 


 Chest 1V Frontal [CR] Stat Exams  06/23/20 13:54 Ordered


 


 CULTURE BLOOD [BC] Urgent Lab  06/23/20 14:31 Ordered


 


 CULTURE BLOOD [BC] Urgent Lab  06/23/20 14:31 Ordered


 


 PROCALCITONIN [CHEM] Routine Lab  06/23/20 14:33 Ordered


 


 UA W/MICROSCOPIC [URIN] Urgent Lab  06/23/20 13:54 Ordered


 


 Sodium Chloride 0.9% [Normal Saline] 1,000 ml Med  06/23/20 13:56 Active





 IV .BOLUS   


 


 Blood Culture x2 Reflex Set [OM.PC] Urgent Oth  06/23/20 14:31 Ordered


 


 EKG 12 Lead [EK] Urgent Ther  06/23/20 13:54 Ordered








                                Medication Orders





Sodium Chloride (Normal Saline)  1,000 mls @ 999 mls/hr IV .BOLUS ONE


   Stop: 06/23/20 14:56








Labs: 


                                Laboratory Tests











  06/23/20 06/23/20 06/23/20 Range/Units





  13:54 14:00 14:00 


 


WBC  15.2 H    (4.5-11.0)  K/uL


 


RBC  5.40    (4.30-5.90)  M/uL


 


Hgb  17.0 H    (12.0-15.0)  g/dL


 


Hct  47.7    (40.0-54.0)  %


 


MCV  88    (80-98)  fL


 


MCH  32 H    (27-31)  pg


 


MCHC  36    (32-36)  %


 


Plt Count  152    (150-400)  K/uL


 


PT    13.3 H  (9.5-12.0)  sec


 


INR    1.25 H  (0.80-1.20)  


 


Sodium     (140-148)  mmol/L


 


Potassium     (3.6-5.2)  mmol/L


 


Chloride     (100-108)  mmol/L


 


Carbon Dioxide     (21-32)  mmol/L


 


Anion Gap     (5.0-14.0)  mmol/L


 


BUN     (7-18)  mg/dL


 


Creatinine     (0.8-1.3)  mg/dL


 


Est Cr Clr Drug Dosing     mL/min


 


Estimated GFR (MDRD)     (>60)  


 


Glucose     ()  mg/dL


 


Lactic Acid     (0.4-2.0)  mmol/L


 


Calcium     (8.5-10.1)  mg/dL


 


Total Bilirubin     (0.2-1.0)  mg/dL


 


AST     (15-37)  U/L


 


ALT     (12-78)  U/L


 


Alkaline Phosphatase     ()  U/L


 


Troponin I   < 0.017   (0.000-0.056)  ng/mL


 


NT-Pro-B Natriuret Pep     (5-450)  pg/mL


 


Total Protein     (6.4-8.2)  g/dL


 


Albumin     (3.4-5.0)  g/dL


 


Globulin     (2.3-3.5)  g/dL


 


Albumin/Globulin Ratio     (1.2-2.2)  














  06/23/20 06/23/20 Range/Units





  14:00 14:00 


 


WBC    (4.5-11.0)  K/uL


 


RBC    (4.30-5.90)  M/uL


 


Hgb    (12.0-15.0)  g/dL


 


Hct    (40.0-54.0)  %


 


MCV    (80-98)  fL


 


MCH    (27-31)  pg


 


MCHC    (32-36)  %


 


Plt Count    (150-400)  K/uL


 


PT    (9.5-12.0)  sec


 


INR    (0.80-1.20)  


 


Sodium  129 L   (140-148)  mmol/L


 


Potassium  3.4 L   (3.6-5.2)  mmol/L


 


Chloride  89 L   (100-108)  mmol/L


 


Carbon Dioxide  32   (21-32)  mmol/L


 


Anion Gap  11.4   (5.0-14.0)  mmol/L


 


BUN  93 H* D   (7-18)  mg/dL


 


Creatinine  2.3 H D   (0.8-1.3)  mg/dL


 


Est Cr Clr Drug Dosing  27.33   mL/min


 


Estimated GFR (MDRD)  28 L   (>60)  


 


Glucose  173 H   ()  mg/dL


 


Lactic Acid   2.2 H  (0.4-2.0)  mmol/L


 


Calcium  8.7   (8.5-10.1)  mg/dL


 


Total Bilirubin  1.1 H   (0.2-1.0)  mg/dL


 


AST  22   (15-37)  U/L


 


ALT  26  D   (12-78)  U/L


 


Alkaline Phosphatase  87   ()  U/L


 


Troponin I    (0.000-0.056)  ng/mL


 


NT-Pro-B Natriuret Pep  2876 H   (5-450)  pg/mL


 


Total Protein  7.5   (6.4-8.2)  g/dL


 


Albumin  3.3 L   (3.4-5.0)  g/dL


 


Globulin  4.2 H   (2.3-3.5)  g/dL


 


Albumin/Globulin Ratio  0.8 L   (1.2-2.2)  











Meds: 


Medications











Generic Name Dose Route Start Last Admin





  Trade Name Freq  PRN Reason Stop Dose Admin


 


Sodium Chloride  1,000 mls @ 999 mls/hr  06/23/20 13:56 





  Normal Saline  IV  06/23/20 14:56 





  .BOLUS ONE  














Discontinued Medications














Generic Name Dose Route Start Last Admin





  Trade Name Freq  PRN Reason Stop Dose Admin


 


Ondansetron HCl  4 mg  06/23/20 13:56 





  Zofran  IVPUSH  06/23/20 13:57 





  ONETIME ONE  














Departure





- Departure


Time of Disposition: 14:36


Disposition: Admitted As Inpatient 67


Clinical Impression: 


 Hypotension, Dehydration, Acute prerenal azotemia





Referrals: 


Jesus Lopez MD [Primary Care Provider] - 


Forms:  ED Department Discharge





Sepsis Event Note (ED)





- Focused Exam


Vital Signs: 


                                   Vital Signs











  Temp Pulse Resp BP Pulse Ox


 


 06/23/20 14:10  35.0 C L  88  18  140/121 H  96


 


 06/23/20 13:36  35.0 C L  88  18  140/121 H  96














- My Orders


Last 24 Hours: 


My Active Orders





06/23/20 13:54


Chest 1V Frontal [CR] Stat 


UA W/MICROSCOPIC [URIN] Urgent 


EKG 12 Lead [EK] Urgent 





06/23/20 13:55


EKG Documentation Completion [RC] ASDIRECTED 





06/23/20 13:56


Sodium Chloride 0.9% [Normal Saline] 1,000 ml IV .BOLUS 





06/23/20 14:31


CULTURE BLOOD [BC] Urgent 


CULTURE BLOOD [BC] Urgent 


Blood Culture x2 Reflex Set [OM.PC] Urgent 





06/23/20 14:33


PROCALCITONIN [CHEM] Routine 














- Assessment/Plan


Last 24 Hours: 


My Active Orders





06/23/20 13:54


Chest 1V Frontal [CR] Stat 


UA W/MICROSCOPIC [URIN] Urgent 


EKG 12 Lead [EK] Urgent 





06/23/20 13:55


EKG Documentation Completion [RC] ASDIRECTED 





06/23/20 13:56


Sodium Chloride 0.9% [Normal Saline] 1,000 ml IV .BOLUS 





06/23/20 14:31


CULTURE BLOOD [BC] Urgent 


CULTURE BLOOD [BC] Urgent 


Blood Culture x2 Reflex Set [OM.PC] Urgent 





06/23/20 14:33


PROCALCITONIN [CHEM] Routine

## 2020-06-24 NOTE — PCM.PN
- General Info


Date of Service: 06/24/20


Subjective Update: 





Mr. Fairbanks reports that he feels somewhat improved today, less lightheaded.  

Heart rate has trended somewhat higher after holding metoprolol and diltiazem 

last night.  Blood pressure remains low today, he has been afebrile and 

otherwise stable.


Functional Status: Reports: Tolerating Diet, Ambulating, Urinating





- Review of Systems


General: Reports: Weakness.  Denies: Fever, Chills


Pulmonary: Reports: No Symptoms


Cardiovascular: Reports: No Symptoms


Gastrointestinal: Reports: No Symptoms





- Patient Data


Vitals - Most Recent: 


                                Last Vital Signs











Temp  95.6 F L  06/24/20 04:00


 


Pulse  103 H  06/24/20 10:02


 


Resp  17   06/24/20 08:00


 


BP  101/61   06/24/20 10:02


 


Pulse Ox  95   06/24/20 08:00











Weight - Most Recent: 165 lb


I&O - Last 24 Hours: 


                                 Intake & Output











 06/23/20 06/24/20 06/24/20





 22:59 06:59 14:59


 


Intake Total 364 1404 112


 


Output Total 1100 1850 


 


Balance -736 -446 112











Lab Results Last 24 Hours: 


                         Laboratory Results - last 24 hr











  06/23/20 06/23/20 06/23/20 Range/Units





  13:54 14:00 14:00 


 


WBC  15.2 H    (4.5-11.0)  K/uL


 


RBC  5.40    (4.30-5.90)  M/uL


 


Hgb  17.0 H    (12.0-15.0)  g/dL


 


Hct  47.7    (40.0-54.0)  %


 


MCV  88    (80-98)  fL


 


MCH  32 H    (27-31)  pg


 


MCHC  36    (32-36)  %


 


Plt Count  152    (150-400)  K/uL


 


Neut % (Auto)     (36-66)  %


 


Lymph % (Auto)     (24-44)  %


 


Mono % (Auto)     (2-6)  %


 


Eos % (Auto)     (2-4)  %


 


Baso % (Auto)     (0-1)  %


 


PT    13.3 H  (9.5-12.0)  sec


 


INR    1.25 H  (0.80-1.20)  


 


Sodium     (140-148)  mmol/L


 


Potassium     (3.6-5.2)  mmol/L


 


Chloride     (100-108)  mmol/L


 


Carbon Dioxide     (21-32)  mmol/L


 


Anion Gap     (5.0-14.0)  mmol/L


 


BUN     (7-18)  mg/dL


 


Creatinine     (0.8-1.3)  mg/dL


 


Est Cr Clr Drug Dosing     mL/min


 


Estimated GFR (MDRD)     (>60)  


 


Glucose     ()  mg/dL


 


Lactic Acid     (0.4-2.0)  mmol/L


 


Calcium     (8.5-10.1)  mg/dL


 


Total Bilirubin     (0.2-1.0)  mg/dL


 


AST     (15-37)  U/L


 


ALT     (12-78)  U/L


 


Alkaline Phosphatase     ()  U/L


 


Troponin I   < 0.017   (0.000-0.056)  ng/mL


 


NT-Pro-B Natriuret Pep     (5-450)  pg/mL


 


Total Protein     (6.4-8.2)  g/dL


 


Albumin     (3.4-5.0)  g/dL


 


Globulin     (2.3-3.5)  g/dL


 


Albumin/Globulin Ratio     (1.2-2.2)  


 


Procalcitonin     ng/mL


 


Urine Color     (YELLOW)  


 


Urine Appearance     (CLEAR)  


 


Urine pH     (5.0-8.0)  


 


Ur Specific Gravity     (1.008-1.030)  


 


Urine Protein     (NEGATIVE)  mg/dL


 


Urine Glucose (UA)     (NEGATIVE)  mg/dL


 


Urine Ketones     (NEGATIVE)  mg/dL


 


Urine Occult Blood     (NEGATIVE)  


 


Urine Nitrite     (NEGATIVE)  


 


Urine Bilirubin     (NEGATIVE)  


 


Urine Urobilinogen     (0.2-1.0)  EU/dL


 


Ur Leukocyte Esterase     (NEGATIVE)  


 


Urine RBC     (0-5)  


 


Urine WBC     (0-5)  


 


Ur Epithelial Cells     


 


Amorphous Sediment     


 


Urine Bacteria     


 


Urine Mucus     














  06/23/20 06/23/20 06/23/20 Range/Units





  14:00 14:00 14:33 


 


WBC     (4.5-11.0)  K/uL


 


RBC     (4.30-5.90)  M/uL


 


Hgb     (12.0-15.0)  g/dL


 


Hct     (40.0-54.0)  %


 


MCV     (80-98)  fL


 


MCH     (27-31)  pg


 


MCHC     (32-36)  %


 


Plt Count     (150-400)  K/uL


 


Neut % (Auto)     (36-66)  %


 


Lymph % (Auto)     (24-44)  %


 


Mono % (Auto)     (2-6)  %


 


Eos % (Auto)     (2-4)  %


 


Baso % (Auto)     (0-1)  %


 


PT     (9.5-12.0)  sec


 


INR     (0.80-1.20)  


 


Sodium  129 L    (140-148)  mmol/L


 


Potassium  3.4 L    (3.6-5.2)  mmol/L


 


Chloride  89 L    (100-108)  mmol/L


 


Carbon Dioxide  32    (21-32)  mmol/L


 


Anion Gap  11.4    (5.0-14.0)  mmol/L


 


BUN  93 H* D    (7-18)  mg/dL


 


Creatinine  2.3 H D    (0.8-1.3)  mg/dL


 


Est Cr Clr Drug Dosing  27.33    mL/min


 


Estimated GFR (MDRD)  28 L    (>60)  


 


Glucose  173 H    ()  mg/dL


 


Lactic Acid   2.2 H   (0.4-2.0)  mmol/L


 


Calcium  8.7    (8.5-10.1)  mg/dL


 


Total Bilirubin  1.1 H    (0.2-1.0)  mg/dL


 


AST  22    (15-37)  U/L


 


ALT  26  D    (12-78)  U/L


 


Alkaline Phosphatase  87    ()  U/L


 


Troponin I     (0.000-0.056)  ng/mL


 


NT-Pro-B Natriuret Pep  2876 H    (5-450)  pg/mL


 


Total Protein  7.5    (6.4-8.2)  g/dL


 


Albumin  3.3 L    (3.4-5.0)  g/dL


 


Globulin  4.2 H    (2.3-3.5)  g/dL


 


Albumin/Globulin Ratio  0.8 L    (1.2-2.2)  


 


Procalcitonin    0.07  ng/mL


 


Urine Color     (YELLOW)  


 


Urine Appearance     (CLEAR)  


 


Urine pH     (5.0-8.0)  


 


Ur Specific Gravity     (1.008-1.030)  


 


Urine Protein     (NEGATIVE)  mg/dL


 


Urine Glucose (UA)     (NEGATIVE)  mg/dL


 


Urine Ketones     (NEGATIVE)  mg/dL


 


Urine Occult Blood     (NEGATIVE)  


 


Urine Nitrite     (NEGATIVE)  


 


Urine Bilirubin     (NEGATIVE)  


 


Urine Urobilinogen     (0.2-1.0)  EU/dL


 


Ur Leukocyte Esterase     (NEGATIVE)  


 


Urine RBC     (0-5)  


 


Urine WBC     (0-5)  


 


Ur Epithelial Cells     


 


Amorphous Sediment     


 


Urine Bacteria     


 


Urine Mucus     














  06/23/20 06/24/20 06/24/20 Range/Units





  16:55 05:11 05:30 


 


WBC   12.7 H   (4.5-11.0)  K/uL


 


RBC   4.78   (4.30-5.90)  M/uL


 


Hgb   14.8  D   (12.0-15.0)  g/dL


 


Hct   43.2   (40.0-54.0)  %


 


MCV   90   (80-98)  fL


 


MCH   31   (27-31)  pg


 


MCHC   34   (32-36)  %


 


Plt Count   161   (150-400)  K/uL


 


Neut % (Auto)   80 H   (36-66)  %


 


Lymph % (Auto)   5 L   (24-44)  %


 


Mono % (Auto)   10 H   (2-6)  %


 


Eos % (Auto)   4   (2-4)  %


 


Baso % (Auto)   1   (0-1)  %


 


PT     (9.5-12.0)  sec


 


INR     (0.80-1.20)  


 


Sodium    139 L  (140-148)  mmol/L


 


Potassium    2.5 L*  (3.6-5.2)  mmol/L


 


Chloride    99 L  (100-108)  mmol/L


 


Carbon Dioxide    33 H  (21-32)  mmol/L


 


Anion Gap    9.5  (5.0-14.0)  mmol/L


 


BUN    63 H  (7-18)  mg/dL


 


Creatinine    1.6 H  (0.8-1.3)  mg/dL


 


Est Cr Clr Drug Dosing    39.91  mL/min


 


Estimated GFR (MDRD)    42 L  (>60)  


 


Glucose    94  ()  mg/dL


 


Lactic Acid     (0.4-2.0)  mmol/L


 


Calcium    8.0 L  (8.5-10.1)  mg/dL


 


Total Bilirubin     (0.2-1.0)  mg/dL


 


AST     (15-37)  U/L


 


ALT     (12-78)  U/L


 


Alkaline Phosphatase     ()  U/L


 


Troponin I     (0.000-0.056)  ng/mL


 


NT-Pro-B Natriuret Pep     (5-450)  pg/mL


 


Total Protein     (6.4-8.2)  g/dL


 


Albumin     (3.4-5.0)  g/dL


 


Globulin     (2.3-3.5)  g/dL


 


Albumin/Globulin Ratio     (1.2-2.2)  


 


Procalcitonin     ng/mL


 


Urine Color  Yellow    (YELLOW)  


 


Urine Appearance  Clear    (CLEAR)  


 


Urine pH  7.0    (5.0-8.0)  


 


Ur Specific Gravity  1.020    (1.008-1.030)  


 


Urine Protein  Negative    (NEGATIVE)  mg/dL


 


Urine Glucose (UA)  Negative    (NEGATIVE)  mg/dL


 


Urine Ketones  Negative    (NEGATIVE)  mg/dL


 


Urine Occult Blood  Negative    (NEGATIVE)  


 


Urine Nitrite  Negative    (NEGATIVE)  


 


Urine Bilirubin  Negative    (NEGATIVE)  


 


Urine Urobilinogen  0.2    (0.2-1.0)  EU/dL


 


Ur Leukocyte Esterase  Negative    (NEGATIVE)  


 


Urine RBC  Not seen    (0-5)  


 


Urine WBC  Not seen    (0-5)  


 


Ur Epithelial Cells  Not seen    


 


Amorphous Sediment  Not seen    


 


Urine Bacteria  Not seen    


 


Urine Mucus  Not seen    











Med Orders - Current: 


                               Current Medications





Acetaminophen (Tylenol)  650 mg PO Q4H PRN


   PRN Reason: Pain (Mild 1-3)/fever


Albuterol (Proventil Neb Soln)  2.5 mg NEB Q4H PRN


   PRN Reason: Shortness Of Breath/wheezing


Apixaban (Eliquis)  5 mg PO BID [facility]


   Last Admin: 06/24/20 10:03 Dose:  5 mg


   Documented by: 


Potassium Chloride 20 meq/Lidocaine HCl 2 ml/ Sodium Chloride  112 mls @ 56 

mls/hr IV Q2H [facility]


   Stop: 06/24/20 11:29


   Last Admin: 06/24/20 10:00 Dose:  56 mls/hr


   Documented by: 


Potassium Chloride 20 meq/Lidocaine HCl 2 ml/ Sodium Chloride  112 mls @ 56 

mls/hr IV Q2H [facility]


   Stop: 06/24/20 15:59


Lisinopril (Prinivil)  10 mg PO DAILY [facility]


   Last Admin: 06/24/20 10:01 Dose:  10 mg


   Documented by: 


Metoprolol Tartrate (Lopressor)  25 mg PO Q12H [facility]


   Last Admin: 06/24/20 10:02 Dose:  25 mg


   Documented by: 


Nicotine (Habitrol)  21 mg TRDERM DAILY [facility]


   Last Admin: 06/24/20 10:03 Dose:  21 mg


   Documented by: 


Ondansetron HCl (Zofran)  4 mg IV Q4H PRN


   PRN Reason: Nausea/Vomiting


Polyethylene Glycol (Miralax)  17 gm PO DAILY PRN


   PRN Reason: Constipation


Potassium Chloride (Klor-Con M20)  40 meq PO ONETIME ONE


   Stop: 06/24/20 12:01


Sodium Chloride (Saline Flush)  10 ml FLUSH ASDIRECTED PRN


   PRN Reason: Keep Vein Open





Discontinued Medications





Diltiazem HCl (Cardizem)  60 mg PO BID ROD


Sodium Chloride (Normal Saline)  1,000 mls @ 999 mls/hr IV .BOLUS ONE


   Stop: 06/23/20 14:56


   Last Admin: 06/23/20 15:05 Dose:  999 mls/hr


   Documented by: 


Sodium Chloride (Normal Saline)  1,000 mls @ 999 mls/hr IV .BOLUS ONE


   Stop: 06/23/20 16:04


   Last Admin: 06/23/20 15:06 Dose:  999 mls/hr


   Documented by: 


Sodium Chloride (Normal Saline)  1,000 mls @ 125 mls/hr IV ASDIRECTED ROD


   Last Admin: 06/24/20 00:34 Dose:  125 mls/hr


   Documented by: 


Lisinopril (Prinivil)  20 mg PO DAILY ROD


Metoprolol Tartrate (Lopressor)  50 mg PO BID ROD


Ondansetron HCl (Zofran)  4 mg IVPUSH ONETIME ONE


   Stop: 06/23/20 13:57


   Last Admin: 06/23/20 15:07 Dose:  4 mg


   Documented by: 


Potassium Chloride (Klor-Con M20)  40 meq PO ONETIME ONE


   Stop: 06/24/20 06:11


   Last Admin: 06/24/20 06:42 Dose:  40 meq


   Documented by: 











- Exam


Quality Assessment: DVT Prophylaxis


General: Alert, Oriented, Cooperative, No Acute Distress


Lungs: Clear to Auscultation, Normal Respiratory Effort, Decreased Breath Sounds


Cardiovascular: Regular Rate, Regular Rhythm, No Murmurs


GI/Abdominal Exam: Soft, Non-Tender, No Organomegaly, No Distention


Extremities: Non-Tender, No Pedal Edema





Sepsis Event Note





- Evaluation


Sepsis Screening Result: No Definite Risk





- Focused Exam


Vital Signs: 


                                   Vital Signs











  Temp Pulse Pulse Resp BP BP Pulse Ox


 


 06/24/20 10:02   103 H    101/61  


 


 06/24/20 10:01      101/61  


 


 06/24/20 08:00     17   91/52 L  95


 


 06/24/20 06:00    91  15   106/53 L 


 


 06/24/20 04:00  95.6 F L   100  15   94/60  95


 


 06/24/20 02:00  96.5 F L   86  14   101/60  95


 


 06/24/20 00:00  98 F   97  20   93/61  98











Date Exam was Performed: 06/24/20


Time Exam was Performed: 10:39





- Problem List Review


Problem List Initiated/Reviewed/Updated: Yes





- My Orders


Last 24 Hours: 


My Active Orders





06/23/20 Lunch


2 Gram Sodium Diet [DIET] 





06/23/20 15:05


Resuscitation Status Routine 





06/23/20 16:07


Acetaminophen [Tylenol]   650 mg PO Q4H PRN 


Albuterol [Proventil Neb Soln]   2.5 mg NEB Q4H PRN 


Ondansetron [Zofran]   4 mg IV Q4H PRN 


Sodium Chloride 0.9% [Saline Flush]   10 ml FLUSH ASDIRECTED PRN 


polyethylene glycoL 3350 [MiraLAX]   17 gm PO DAILY PRN 





06/23/20 16:07


Patient Status [ADT] Routine 


Ambulate [RC] QID 


Cardiac Monitoring [RC] .As Directed 


Height and Weight [RC] DAILY 


Intake and Output [RC] QSHIFT 


Notify Provider Vital Signs [RC] ASDIRECTED 


Oxygen Therapy [RC] PRN 


Peripheral IV Care [RC] .AS DIRECTED 


RT Aerosol Therapy [RC] ASDIRECTED 


Up With Assistance [RC] ASDIRECTED 


Up to Chair [RC] QID 


VTE/DVT Education [RC] Per Unit Routine 


Vital Signs [RC] Q2H 


Peripheral IV Insertion Adult [OM.PC] Routine 


VTE Pharmacological Contraindications [AST] Per Unit Routine 





06/23/20 20:30


Nicotine [Habitrol]   21 mg TRDERM DAILY 





06/23/20 21:00


Apixaban [Eliquis]   5 mg PO BID 





06/24/20 07:30


Potassium Chloride 20 meq Lidocaine 1% [Xylocaine 1%] 2 ml   Sodium Chloride 

0.9% [Normal Saline] 100 ml IV Q2H 





06/24/20 09:30


Metoprolol Tartrate [Lopressor]   25 mg PO Q12H 


Convert IV to Saline Lock [OM.PC] Routine 





06/24/20 10:00


lisinopriL [Prinivil]   10 mg PO DAILY 





06/24/20 10:45


Diltiazem IR [Cardizem]   30 mg PO Q12HR 





06/24/20 12:00


Potassium Chloride 20 meq Lidocaine 1% [Xylocaine 1%] 2 ml   Sodium Chloride 

0.9% [Normal Saline] 100 ml IV Q2H 


Potassium Chloride [Klor-Con M20]   40 meq PO ONETIME ONE 














- Plan


Plan:: 





ASSESSMENT AND PLAN





HYPOTENSION-likely secondary to intravascular volume depletion from diuretic 

therapy as well as effects of antihypertensive medications.  Pressure improved 

but remains somewhat low.


-Saline lock IV





ACUTE KIDNEY INJURY-likely secondary to dehydration and intravascular volume 

depletion secondary to diuretic therapy.  Renal function has improved from 

yesterday with IV hydration


-IV fluids for hydration


-Closely monitor urine output and renal function


-Continue to hold diuretic therapy





CONGESTIVE HEART FAILURE-documented decrease in left ventricular systolic 

function noted on echocardiogram from April of this year, estimated ejection 

fraction of 30 to 35%.


-Continue outpatient medications.  Plan to decrease doses of metoprolol, 

diltiazem, and lisinopril.


-Hold diuretic therapy until renal function improves





LUNG CANCER-recent diagnosis, status post radiation therapy last week





MAINTENANCE ISSUES


-DVT prophylaxis; current therapy with Eliquis should provide adequate DVT 

prophylaxis


-GI prophylaxis; not indicated


-Lepe catheter; not indicated


-Nutrition; 2 g sodium diet


-Nicotine dependence; not required





CODE STATUS-FULL CODE





ADMISSION STATUS-patient will be admitted to inpatient status, expect at least a

2 night hospital stay for evaluation and management of problems as outlined 

above.  At the time of this admission I do not reasonably expected evaluation a

nd management of this problem will require more than a 96 hour hospital stay.





DISPOSITION-anticipate discharge to home after the hospital stay.





PRIMARY CARE PROVIDER-Dr. oLpez

## 2020-06-25 NOTE — PCM.PN
- General Info


Date of Service: 06/25/20


Subjective Update: 





Mr. Fairbanks has been fairly stable since yesterday.  Heart rate is modestly 

elevated with changes in medication, blood pressure remains borderline.  He 

denies significant shortness of breath or lightheadedness.


Functional Status: Reports: Tolerating Diet, Ambulating, Urinating





- Review of Systems


General: Reports: No Symptoms


Pulmonary: Reports: No Symptoms


Cardiovascular: Reports: No Symptoms


Gastrointestinal: Reports: No Symptoms





- Patient Data


Vitals - Most Recent: 


                                Last Vital Signs











Temp  96.1 F L  06/25/20 04:00


 


Pulse  112 H  06/25/20 08:35


 


Resp  14   06/25/20 06:00


 


BP  102/61   06/25/20 08:35


 


Pulse Ox  96   06/25/20 04:00











Weight - Most Recent: 165 lb


I&O - Last 24 Hours: 


                                 Intake & Output











 06/24/20 06/25/20 06/25/20





 22:59 06:59 14:59


 


Intake Total 1557  


 


Output Total 900 725 


 


Balance 657 -725 











Lab Results Last 24 Hours: 


                         Laboratory Results - last 24 hr











  06/24/20 Range/Units





  16:55 


 


Potassium  4.7  (3.6-5.2)  mmol/L











[place] Results Last 24 Hours: 


                                  Microbiology











 06/23/20 14:48 Aerobic Blood Culture - Preliminary





 Blood - Arm, Right    NO GROWTH AFTER 1 DAY





 Anaerobic Blood Culture - Preliminary





    NO GROWTH AFTER 1 DAY


 


 06/23/20 14:53 Aerobic Blood Culture - Preliminary





 Blood - Arm, Left    NO GROWTH AFTER 1 DAY





 Anaerobic Blood Culture - Preliminary





    NO GROWTH AFTER 1 DAY











Med Orders - Current: 


                               Current Medications





Acetaminophen (Tylenol)  650 mg PO Q4H PRN


   PRN Reason: Pain (Mild 1-3)/fever


Albuterol (Proventil Neb Soln)  2.5 mg NEB Q4H PRN


   PRN Reason: Shortness Of Breath/wheezing


Apixaban (Eliquis)  5 mg PO BID [facility]


   Last Admin: 06/25/20 08:29 Dose:  5 mg


   Documented by: 


Digoxin (Lanoxin)  250 mcg IVPUSH ONETIME ONE


   Stop: 06/25/20 09:42


Lorazepam (Ativan)  0 mg PO ASDIRECTED [facility]; Protocol


Metoprolol Tartrate (Lopressor)  50 mg PO Q12H ROD


Nicotine (Habitrol)  21 mg TRDERM DAILY [facility]


   Last Admin: 06/25/20 08:29 Dose:  21 mg


   Documented by: 


Ondansetron HCl (Zofran)  4 mg IV Q4H PRN


   PRN Reason: Nausea/Vomiting


Polyethylene Glycol (Miralax)  17 gm PO DAILY PRN


   PRN Reason: Constipation


Sodium Chloride (Saline Flush)  10 ml FLUSH ASDIRECTED PRN


   PRN Reason: Keep Vein Open





Discontinued Medications





Diltiazem HCl (Cardizem)  60 mg PO BID [facility]


Diltiazem HCl (Cardizem)  30 mg PO BID [facility]


   Last Admin: 06/25/20 08:35 Dose:  30 mg


   Documented by: 


Sodium Chloride (Normal Saline)  1,000 mls @ 999 mls/hr IV .BOLUS ONE


   Stop: 06/23/20 14:56


   Last Admin: 06/23/20 15:05 Dose:  999 mls/hr


   Documented by: 


Sodium Chloride (Normal Saline)  1,000 mls @ 999 mls/hr IV .BOLUS ONE


   Stop: 06/23/20 16:04


   Last Admin: 06/23/20 15:06 Dose:  999 mls/hr


   Documented by: 


Sodium Chloride (Normal Saline)  1,000 mls @ 125 mls/hr IV ASDIRECTED [facility]


   Last Admin: 06/24/20 00:34 Dose:  125 mls/hr


   Documented by: 


Potassium Chloride 20 meq/Lidocaine HCl 2 ml/ Sodium Chloride  112 mls @ 56 

mls/hr IV Q2H [facility]


   Stop: 06/24/20 11:29


   Last Admin: 06/24/20 10:00 Dose:  56 mls/hr


   Documented by: 


Potassium Chloride 20 meq/Lidocaine HCl 2 ml/ Sodium Chloride  112 mls @ 56 

mls/hr IV Q2H [facility]


   Stop: 06/24/20 15:59


   Last Admin: 06/24/20 14:30 Dose:  56 mls/hr


   Documented by: 


Lisinopril (Prinivil)  20 mg PO DAILY [facility]


   Last Admin: 06/24/20 10:42 Dose:  Not Given


   Documented by: 


Lisinopril (Prinivil)  10 mg PO DAILY [facility]


   Last Admin: 06/25/20 08:35 Dose:  10 mg


   Documented by: 


Metoprolol Tartrate (Lopressor)  50 mg PO BID [facility]


Metoprolol Tartrate (Lopressor)  25 mg PO Q12H [facility]


   Last Admin: 06/25/20 08:35 Dose:  25 mg


   Documented by: 


Ondansetron HCl (Zofran)  4 mg IVPUSH ONETIME ONE


   Stop: 06/23/20 13:57


   Last Admin: 06/23/20 15:07 Dose:  4 mg


   Documented by: 


Potassium Chloride (Klor-Con M20)  40 meq PO ONETIME ONE


   Stop: 06/24/20 06:11


   Last Admin: 06/24/20 06:42 Dose:  40 meq


   Documented by: 


Potassium Chloride (Klor-Con M20)  40 meq PO ONETIME ONE


   Stop: 06/24/20 12:01


   Last Admin: 06/24/20 11:24 Dose:  40 meq


   Documented by: 











- Exam


Quality Assessment: DVT Prophylaxis


General: Alert, Oriented, Cooperative, No Acute Distress


Lungs: Clear to Auscultation, Normal Respiratory Effort, Decreased Breath 

Sounds.  No: Rales, Rhonchi, Wheezing


Cardiovascular: Regular Rate, Regular Rhythm, No Murmurs


GI/Abdominal Exam: Soft, Non-Tender, No Organomegaly, No Distention


Extremities: Non-Tender, No Pedal Edema





Sepsis Event Note





- Evaluation


Sepsis Screening Result: Severe Sepsis Risk





- Focused Exam


Vital Signs: 


                                   Vital Signs











  Temp Pulse Pulse Resp BP BP Pulse Ox


 


 06/25/20 08:35   112 H    102/61  


 


 06/25/20 06:00    107 H  14   


 


 06/25/20 04:00  96.1 F L   86  22 H   101/59 L  96


 


 06/25/20 02:00    91  22 H   92/60  95


 


 06/25/20 00:00  97.0 F   93  22 H   108/67  96


 


 06/24/20 22:00    105 H  16   139/115 H  97











Date Exam was Performed: 06/25/20


Time Exam was Performed: 09:44





- Problem List Review


Problem List Initiated/Reviewed/Updated: Yes





- My Orders


Last 24 Hours: 


My Active Orders





06/24/20 09:30


Convert IV to Saline Lock [OM.PC] Routine 





06/24/20 15:41


[employer] Assessment [RC] Q4H 


Notify Provider [RC] PRN 





06/24/20 15:45


LORazepam [Ativan]   See Protocol  PO ASDIRECTED 





06/25/20 09:41


Digoxin [Lanoxin]   250 mcg IVPUSH ONETIME ONE 





06/25/20 09:41


Metoprolol Tartrate [Lopressor]   50 mg PO Q12H 





06/25/20 09:43


BASIC METABOLIC PANEL,BMP [CHEM] Stat 





06/26/20 05:00


BASIC METABOLIC PANEL,BMP [CHEM] Timed 


CBC WITH AUTO DIFF [HEME] Timed 














- Plan


Plan:: 





ASSESSMENT AND PLAN





HYPOTENSION-likely secondary to intravascular volume depletion from diuretic 

therapy as well as effects of antihypertensive medications.  Pressure improved 

but remains somewhat low.


-Saline lock IV





ACUTE KIDNEY INJURY-likely secondary to dehydration and intravascular volume 

depletion secondary to diuretic therapy.  Renal function has improved from 

yesterday with IV hydration


-Saline lock


-Closely monitor urine output and renal function


-Continue to hold diuretic therapy





CONGESTIVE HEART FAILURE-documented decrease in left ventricular systolic 

function noted on echocardiogram from April of this year, estimated ejection 

fraction of 30 to 35%.


-Resume metoprolol at 50 mg twice daily


-Hold lisinopril and diltiazem


-Hold diuretic therapy until renal function improves





ATRIAL FIBRILLATION WITH RAPID VENTRICULAR RESPONSE-difficult to adjust 

medications so that he is not hypotensive but still has good rate control of his

atrial fibrillation


-Metoprolol as above


-Digoxin 0.25 mg IV now





LUNG CANCER-recent diagnosis, status post radiation therapy last week





MAINTENANCE ISSUES


-DVT prophylaxis; current therapy with Eliquis should provide adequate DVT 

prophylaxis


-GI prophylaxis; not indicated


-Lepe catheter; not indicated


-Nutrition; 2 g sodium diet


-Nicotine dependence; not required





CODE STATUS-FULL CODE





ADMISSION STATUS-patient will be admitted to inpatient status, expect at least a

2 night hospital stay for evaluation and management of problems as outlined 

above.  At the time of this admission I do not reasonably expected evaluation 

and management of this problem will require more than a 96 hour hospital stay.





DISPOSITION-anticipate discharge to home after the hospital stay.





PRIMARY CARE PROVIDER-Dr. Lopez

## 2020-06-26 NOTE — [id number].[id number]
**Discharge Summary





- Hospital Course


Brief History: Mr. Fairbanks is an 79-year-old gentleman who was admitted through 

the emergency department with weakness lightheadedness and recent falls 

secondary to dehydration and acute kidney injury.





- Discharge Data


Discharge Date: 06/26/20


Discharge Disposition: Home, Self-Care 01


Condition: Fair





- Referral to Home Health


Primary Care Physician: 


Jesus Lopez MD








- Discharge Diagnosis/Problem(s)


(1) Acute kidney injury


SNOMED Code(s): 38121663, 89295065


   ICD Code: N17.9 - ACUTE KIDNEY FAILURE, UNSPECIFIED   Status: Acute   Current

Visit: Yes   





(2) Tobacco use


SNOMED Code(s): 316654810


   ICD Code: Z72.0 - TOBACCO USE   Status: Chronic   Current Visit: No   





(3) Atrial fibrillation with RVR


SNOMED Code(s): 907862588384002


   ICD Code: I48.91 - UNSPECIFIED ATRIAL FIBRILLATION   Status: Chronic   

Current Visit: No   





(4) Congestive heart failure (CHF)


SNOMED Code(s): 52999414


   ICD Code: I50.9 - HEART FAILURE, UNSPECIFIED   Status: Chronic   Current 

Visit: No   


Qualifiers: 


   Heart failure type: unspecified   Heart failure chronicity: unspecified   

Qualified Code(s): I50.9 - Heart failure, unspecified   





(5) Hypotension


SNOMED Code(s): 13900619


   ICD Code: I95.9 - HYPOTENSION, UNSPECIFIED   Status: Acute   Current Visit: 

Yes   





(6) Dehydration


SNOMED Code(s): 25323604


   ICD Code: E86.0 - DEHYDRATION   Status: Acute   Current Visit: Yes   





- Patient Summary/Data


Hospital Course: 





Mr. Fairbanks is a 79-year-old gentleman who was admitted through the emergency 

department for further evaluation and management of hypotension, dehydration, 

and acute kidney injury.  Mr. Fairbanks has a known history of coronary artery 

disease and congestive heart failure, estimated systolic ejection fraction from 

echocardiogram in April was 30 to 35%.  He is also been recently diagnosed with 

lung carcinoma and completed a recent course of radiation therapy.  Over the 

past few weeks he has become progressively more weak and lightheaded with 

standing.  On evaluation in the emergency department his creatinine is 

significantly elevated from baseline qualifying as acute kidney injury.  There 

is no evidence of underlying infection although his white blood cell count is 

elevated at 15,000.  Procalcitonin was well within normal range making infection

much less likely.  He denies any recent symptoms of chest pain or pressure and 

also denies shortness of breath.  On admission he was given IV fluids for 

hydration, metoprolol, diltiazem, and furosemide were held.  Heart rate with his

atrial fibrillation increased as a result of holding the metoprolol and 

diltiazem.  Medications were adjusted during admission for control of his atrial

fibrillation and to decrease hypotension.  At the time of discharge he was 

feeling well with good rate control of his atrial fibrillation and adequate 

blood pressure.  Diltiazem will be discontinued and he was started on digoxin 

0.25 mg p.o. daily.  Metoprolol dose will remain unchanged, but lisinopril was 

decreased to 5 mg p.o. daily.  Furosemide will be decreased to 40 mg daily and 

metolazone will be held at this time.  He is encouraged to follow a low-sodium 

diet, activity will be as tolerated.  He has also been encouraged to stop 

smoking and to limit his alcohol intake.  Follow-up appointment will be 

scheduled with his primary care provider within 1 week, BMP and digoxin level 

should be obtained at the time of follow-up appointment.





- Patient Instructions


Diet: Low Sodium


Activity: As Tolerated


Other/Special Instructions: Please schedule follow-up appointment with Dr. Lopez within 1 week.  BMP and digoxin level should be obtained at the time of 

follow-up appointment.





- Discharge Plan


*PRESCRIPTION DRUG MONITORING PROGRAM REVIEWED*: Not Applicable


*COPY OF PRESCRIPTION DRUG MONITORING REPORT IN PATIENT MARIAM: Not Applicable


Prescriptions/Med Rec: 


Digoxin 250 mcg PO DAILY #30 tablet


lisinopriL [Prinivil] 5 mg PO DAILY #30 tablet


Home Medications: 


                                    Home Meds





Cyanocobalamin (Vitamin B-12) [Vitamin B-12] 1 tab PO DAILY 04/18/20 [History]


Metoprolol Tartrate 50 mg PO BID #60 tablet 04/19/20 [Rx]


Apixaban [Eliquis] 5 mg PO BID 06/23/20 [History]


Digoxin 250 mcg PO DAILY #30 tablet 06/26/20 [Rx]


Furosemide [Lasix] 40 mg PO DAILY #0 06/26/20 [Rx]


lisinopriL [Prinivil] 5 mg PO DAILY #30 tablet 06/26/20 [Rx]








Referrals: 


Jesus Lopez MD [Primary Care Provider] - 07/02/20 2:40 pm ([facility])





- Discharge Summary/Plan Comment


DC Time >30 min.: No





- Patient Data


Vitals - Most Recent: 


                                Last Vital Signs











Temp  98.7 F   06/26/20 11:00


 


Pulse  68   06/26/20 11:00


 


Resp  16   06/26/20 11:00


 


BP  111/71   06/26/20 11:00


 


Pulse Ox  95   06/26/20 11:00











Weight - Most Recent: 165 lb


I&O - Last 24 hours: 


                                 Intake & Output











 06/25/20 06/26/20 06/26/20





 22:59 06:59 14:59


 


Intake Total  240 


 


Output Total 250  640


 


Balance -250 240 -640











Lab Results - Last 24 hrs: 


                         Laboratory Results - last 24 hr











  06/26/20 06/26/20 Range/Units





  04:10 04:10 


 


WBC  10.8   (4.5-11.0)  K/uL


 


RBC  4.65   (4.30-5.90)  M/uL


 


Hgb  14.2   (12.0-15.0)  g/dL


 


Hct  43.6   (40.0-54.0)  %


 


MCV  94   (80-98)  fL


 


MCH  31   (27-31)  pg


 


MCHC  33   (32-36)  %


 


Plt Count  138 L   (150-400)  K/uL


 


Neut % (Auto)  73 H   (36-66)  %


 


Lymph % (Auto)  7 L   (24-44)  %


 


Mono % (Auto)  13 H   (2-6)  %


 


Eos % (Auto)  7 H   (2-4)  %


 


Baso % (Auto)  1   (0-1)  %


 


Sodium   142  (140-148)  mmol/L


 


Potassium   3.3 L  (3.6-5.2)  mmol/L


 


Chloride   103  (100-108)  mmol/L


 


Carbon Dioxide   36 H  (21-32)  mmol/L


 


Anion Gap   6.3  (5.0-14.0)  mmol/L


 


BUN   29 H  (7-18)  mg/dL


 


Creatinine   1.0  (0.8-1.3)  mg/dL


 


Est Cr Clr Drug Dosing   63.85  mL/min


 


Estimated GFR (MDRD)   > 60  (>60)  


 


Glucose   119 H  ()  mg/dL


 


Calcium   8.5  (8.5-10.1)  mg/dL


 


Digoxin   0.74 L  (0.90-2.00)  ng/mL











NIHARIKA Results - Last 24 hrs: 


                                  Microbiology











 06/23/20 14:53 Aerobic Blood Culture - Preliminary





 Blood - Arm, Left    NO GROWTH AFTER 2 DAYS





 Anaerobic Blood Culture - Preliminary





    NO GROWTH AFTER 2 DAYS


 


 06/23/20 14:48 Aerobic Blood Culture - Preliminary





 Blood - Arm, Right    NO GROWTH AFTER 2 DAYS





 Anaerobic Blood Culture - Preliminary





    NO GROWTH AFTER 2 DAYS











Med Orders - Current: 


                               Current Medications





Acetaminophen (Tylenol)  650 mg PO Q4H PRN


   PRN Reason: Pain (Mild 1-3)/fever


Albuterol (Proventil Neb Soln)  2.5 mg NEB Q4H PRN


   PRN Reason: Shortness Of Breath/wheezing


Apixaban (Eliquis)  5 mg PO BID [facility]


   Last Admin: 06/26/20 09:34 Dose:  5 mg


   Documented by: 


Lisinopril (Prinivil)  5 mg PO DAILY [facility]


   Last Admin: 06/26/20 09:36 Dose:  5 mg


   Documented by: 


Lorazepam (Ativan)  0 mg PO ASDIRECTED [facility]; Protocol


Metoprolol Tartrate (Lopressor)  50 mg PO BID [facility]


   Last Admin: 06/26/20 09:35 Dose:  50 mg


   Documented by: 


Nicotine (Habitrol)  21 mg TRDERM DAILY [facility]


   Last Admin: 06/26/20 09:35 Dose:  21 mg


   Documented by: 


Ondansetron HCl (Zofran)  4 mg IV Q4H PRN


   PRN Reason: Nausea/Vomiting


Polyethylene Glycol (Miralax)  17 gm PO DAILY PRN


   PRN Reason: Constipation


Sodium Chloride (Saline Flush)  10 ml FLUSH ASDIRECTED PRN


   PRN Reason: Keep Vein Open





Discontinued Medications





Digoxin (Lanoxin)  250 mcg IVPUSH ONETIME ONE


   Stop: 06/25/20 10:51


   Last Admin: 06/25/20 10:41 Dose:  250 mcg


   Documented by: 


Digoxin (Lanoxin)  250 mcg PO ONETIME ONE


   Stop: 06/25/20 21:01


Digoxin (Lanoxin)  250 mcg PO ONETIME STA


   Stop: 06/25/20 18:20


   Last Admin: 06/25/20 18:32 Dose:  250 mcg


   Documented by: 


Digoxin (Lanoxin)  250 mcg PO ONETIME ONE


   Stop: 06/26/20 13:01


Diltiazem HCl (Cardizem)  60 mg PO BID [facility]


Diltiazem HCl (Cardizem)  30 mg PO BID [facility]


   Last Admin: 06/25/20 08:35 Dose:  30 mg


   Documented by: 


Sodium Chloride (Normal Saline)  1,000 mls @ 999 mls/hr IV .BOLUS ONE


   Stop: 06/23/20 14:56


   Last Admin: 06/23/20 15:05 Dose:  999 mls/hr


   Documented by: 


Sodium Chloride (Normal Saline)  1,000 mls @ 999 mls/hr IV .BOLUS ONE


   Stop: 06/23/20 16:04


   Last Admin: 06/23/20 15:06 Dose:  999 mls/hr


   Documented by: 


Sodium Chloride (Normal Saline)  1,000 mls @ 125 mls/hr IV ASDIRECTED [facility]


   Last Admin: 06/24/20 00:34 Dose:  125 mls/hr


   Documented by: 


Potassium Chloride 20 meq/Lidocaine HCl 2 ml/ Sodium Chloride  112 mls @ 56 

mls/hr IV Q2H [facility]


   Stop: 06/24/20 11:29


   Last Admin: 06/24/20 10:00 Dose:  56 mls/hr


   Documented by: 


Potassium Chloride 20 meq/Lidocaine HCl 2 ml/ Sodium Chloride  112 mls @ 56 

mls/hr IV Q2H [facility]


   Stop: 06/24/20 15:59


   Last Admin: 06/24/20 14:30 Dose:  56 mls/hr


   Documented by: 


Lisinopril (Prinivil)  20 mg PO DAILY [facility]


   Last Admin: 06/24/20 10:42 Dose:  Not Given


   Documented by: 


Lisinopril (Prinivil)  10 mg PO DAILY [facility]


   Last Admin: 06/25/20 08:35 Dose:  10 mg


   Documented by: 


Metoprolol Tartrate (Lopressor)  50 mg PO BID [facility]


Metoprolol Tartrate (Lopressor)  25 mg PO Q12H [facility]


   Last Admin: 06/25/20 08:35 Dose:  25 mg


   Documented by: 


Metoprolol Tartrate (Lopressor)  25 mg PO ONETIME ONE


   Stop: 06/25/20 09:42


   Last Admin: 06/25/20 10:49 Dose:  25 mg


   Documented by: 


Ondansetron HCl (Zofran)  4 mg IVPUSH ONETIME ONE


   Stop: 06/23/20 13:57


   Last Admin: 06/23/20 15:07 Dose:  4 mg


   Documented by: 


Potassium Chloride (Klor-Con M20)  40 meq PO ONETIME ONE


   Stop: 06/24/20 06:11


   Last Admin: 06/24/20 06:42 Dose:  40 meq


   Documented by: 


Potassium Chloride (Klor-Con M20)  40 meq PO ONETIME ONE


   Stop: 06/24/20 12:01


   Last Admin: 06/24/20 11:24 Dose:  40 meq


   Documented by: 


Potassium Chloride (Klor-Con M20)  40 meq PO ONETIME ONE


   Stop: 06/26/20 09:01


   Last Admin: 06/26/20 09:39 Dose:  40 meq


   Documented by: 











- Exam


Quality Assessment: Reports: DVT Prophylaxis


General: Reports: Alert, Oriented, Cooperative, No Acute Distress


Lungs: Reports: Clear to Auscultation, Normal Respiratory Effort


Cardiovascular: Reports: Regular Rate, No Murmurs, Irregular Rhythm


GI/Abdominal Exam: Soft, Non-Tender, No Organomegaly, No Distention


Extremities: Non-Tender, No Pedal Edema





*Q Meaningful Use (DIS)





- VTE *Q


VTE Pharmacological Contraindications *Q: High INR Value

## 2022-07-27 ENCOUNTER — HOSPITAL ENCOUNTER (INPATIENT)
Dept: HOSPITAL 11 - JP.ED | Age: 82
LOS: 2 days | Discharge: HOME | DRG: 177 | End: 2022-07-29
Attending: INTERNAL MEDICINE | Admitting: INTERNAL MEDICINE
Payer: MEDICARE

## 2022-07-27 DIAGNOSIS — I10: ICD-10-CM

## 2022-07-27 DIAGNOSIS — K21.9: ICD-10-CM

## 2022-07-27 DIAGNOSIS — Z79.890: ICD-10-CM

## 2022-07-27 DIAGNOSIS — Z66: ICD-10-CM

## 2022-07-27 DIAGNOSIS — I48.20: ICD-10-CM

## 2022-07-27 DIAGNOSIS — Z79.01: ICD-10-CM

## 2022-07-27 DIAGNOSIS — J96.01: ICD-10-CM

## 2022-07-27 DIAGNOSIS — Z79.899: ICD-10-CM

## 2022-07-27 DIAGNOSIS — J12.82: ICD-10-CM

## 2022-07-27 DIAGNOSIS — F17.210: ICD-10-CM

## 2022-07-27 DIAGNOSIS — C34.11: ICD-10-CM

## 2022-07-27 DIAGNOSIS — U07.1: Primary | ICD-10-CM

## 2022-07-27 DIAGNOSIS — C79.9: ICD-10-CM

## 2022-07-27 DIAGNOSIS — C78.01: ICD-10-CM

## 2022-07-27 DIAGNOSIS — J43.9: ICD-10-CM

## 2022-07-27 PROCEDURE — 85025 COMPLETE CBC W/AUTO DIFF WBC: CPT

## 2022-07-27 PROCEDURE — 36415 COLL VENOUS BLD VENIPUNCTURE: CPT

## 2022-07-27 PROCEDURE — 99285 EMERGENCY DEPT VISIT HI MDM: CPT

## 2022-07-27 PROCEDURE — 84145 PROCALCITONIN (PCT): CPT

## 2022-07-27 PROCEDURE — 83605 ASSAY OF LACTIC ACID: CPT

## 2022-07-27 PROCEDURE — U0002 COVID-19 LAB TEST NON-CDC: HCPCS

## 2022-07-27 PROCEDURE — 8E0ZXY6 ISOLATION: ICD-10-PCS | Performed by: INTERNAL MEDICINE

## 2022-07-27 PROCEDURE — 86140 C-REACTIVE PROTEIN: CPT

## 2022-07-27 PROCEDURE — 80053 COMPREHEN METABOLIC PANEL: CPT

## 2022-07-27 PROCEDURE — XW033E5 INTRODUCTION OF REMDESIVIR ANTI-INFECTIVE INTO PERIPHERAL VEIN, PERCUTANEOUS APPROACH, NEW TECHNOLOGY GROUP 5: ICD-10-PCS | Performed by: INTERNAL MEDICINE

## 2022-07-27 PROCEDURE — 81001 URINALYSIS AUTO W/SCOPE: CPT

## 2022-07-27 PROCEDURE — 96365 THER/PROPH/DIAG IV INF INIT: CPT

## 2022-07-27 PROCEDURE — 71045 X-RAY EXAM CHEST 1 VIEW: CPT

## 2022-07-27 PROCEDURE — 3E0333Z INTRODUCTION OF ANTI-INFLAMMATORY INTO PERIPHERAL VEIN, PERCUTANEOUS APPROACH: ICD-10-PCS | Performed by: INTERNAL MEDICINE

## 2022-07-27 PROCEDURE — 80162 ASSAY OF DIGOXIN TOTAL: CPT

## 2022-07-27 PROCEDURE — 82803 BLOOD GASES ANY COMBINATION: CPT

## 2022-07-27 PROCEDURE — 87040 BLOOD CULTURE FOR BACTERIA: CPT

## 2022-07-27 PROCEDURE — 71275 CT ANGIOGRAPHY CHEST: CPT

## 2022-07-27 PROCEDURE — 96361 HYDRATE IV INFUSION ADD-ON: CPT

## 2022-07-28 RX ADMIN — CYANOCOBALAMIN TAB 1000 MCG SCH MCG: 1000 TAB at 08:37

## 2022-07-29 RX ADMIN — CYANOCOBALAMIN TAB 1000 MCG SCH MCG: 1000 TAB at 09:25
